# Patient Record
Sex: FEMALE | Race: BLACK OR AFRICAN AMERICAN
[De-identification: names, ages, dates, MRNs, and addresses within clinical notes are randomized per-mention and may not be internally consistent; named-entity substitution may affect disease eponyms.]

---

## 2021-11-11 ENCOUNTER — HOSPITAL ENCOUNTER (INPATIENT)
Dept: HOSPITAL 54 - ER | Age: 56
LOS: 3 days | Discharge: NURSING, REHAB OR LONG-TERM CARE | DRG: 871 | End: 2021-11-14
Attending: NURSE PRACTITIONER | Admitting: NURSE PRACTITIONER
Payer: MEDICARE

## 2021-11-11 VITALS — DIASTOLIC BLOOD PRESSURE: 79 MMHG | SYSTOLIC BLOOD PRESSURE: 119 MMHG

## 2021-11-11 VITALS — WEIGHT: 126 LBS | HEIGHT: 65 IN | BODY MASS INDEX: 20.99 KG/M2

## 2021-11-11 VITALS — DIASTOLIC BLOOD PRESSURE: 66 MMHG | SYSTOLIC BLOOD PRESSURE: 112 MMHG

## 2021-11-11 VITALS — DIASTOLIC BLOOD PRESSURE: 67 MMHG | SYSTOLIC BLOOD PRESSURE: 110 MMHG

## 2021-11-11 VITALS — DIASTOLIC BLOOD PRESSURE: 66 MMHG | SYSTOLIC BLOOD PRESSURE: 109 MMHG

## 2021-11-11 VITALS — SYSTOLIC BLOOD PRESSURE: 120 MMHG | DIASTOLIC BLOOD PRESSURE: 67 MMHG

## 2021-11-11 VITALS — DIASTOLIC BLOOD PRESSURE: 68 MMHG | SYSTOLIC BLOOD PRESSURE: 118 MMHG

## 2021-11-11 VITALS — DIASTOLIC BLOOD PRESSURE: 65 MMHG | SYSTOLIC BLOOD PRESSURE: 106 MMHG

## 2021-11-11 VITALS — DIASTOLIC BLOOD PRESSURE: 62 MMHG | SYSTOLIC BLOOD PRESSURE: 103 MMHG

## 2021-11-11 VITALS — SYSTOLIC BLOOD PRESSURE: 112 MMHG | DIASTOLIC BLOOD PRESSURE: 63 MMHG

## 2021-11-11 VITALS — DIASTOLIC BLOOD PRESSURE: 68 MMHG | SYSTOLIC BLOOD PRESSURE: 114 MMHG

## 2021-11-11 VITALS — SYSTOLIC BLOOD PRESSURE: 108 MMHG | DIASTOLIC BLOOD PRESSURE: 66 MMHG

## 2021-11-11 VITALS — SYSTOLIC BLOOD PRESSURE: 116 MMHG | DIASTOLIC BLOOD PRESSURE: 71 MMHG

## 2021-11-11 VITALS — SYSTOLIC BLOOD PRESSURE: 110 MMHG | DIASTOLIC BLOOD PRESSURE: 66 MMHG

## 2021-11-11 VITALS — DIASTOLIC BLOOD PRESSURE: 68 MMHG | SYSTOLIC BLOOD PRESSURE: 113 MMHG

## 2021-11-11 VITALS — SYSTOLIC BLOOD PRESSURE: 106 MMHG | DIASTOLIC BLOOD PRESSURE: 68 MMHG

## 2021-11-11 DIAGNOSIS — Z79.899: ICD-10-CM

## 2021-11-11 DIAGNOSIS — N31.9: ICD-10-CM

## 2021-11-11 DIAGNOSIS — Z88.2: ICD-10-CM

## 2021-11-11 DIAGNOSIS — Z89.611: ICD-10-CM

## 2021-11-11 DIAGNOSIS — Z79.82: ICD-10-CM

## 2021-11-11 DIAGNOSIS — E87.2: ICD-10-CM

## 2021-11-11 DIAGNOSIS — E11.9: ICD-10-CM

## 2021-11-11 DIAGNOSIS — J96.02: ICD-10-CM

## 2021-11-11 DIAGNOSIS — N39.0: ICD-10-CM

## 2021-11-11 DIAGNOSIS — I10: ICD-10-CM

## 2021-11-11 DIAGNOSIS — G89.29: ICD-10-CM

## 2021-11-11 DIAGNOSIS — R53.2: ICD-10-CM

## 2021-11-11 DIAGNOSIS — Z86.73: ICD-10-CM

## 2021-11-11 DIAGNOSIS — G93.41: ICD-10-CM

## 2021-11-11 DIAGNOSIS — E78.5: ICD-10-CM

## 2021-11-11 DIAGNOSIS — Z20.822: ICD-10-CM

## 2021-11-11 DIAGNOSIS — G40.901: ICD-10-CM

## 2021-11-11 DIAGNOSIS — I25.10: ICD-10-CM

## 2021-11-11 DIAGNOSIS — G35: ICD-10-CM

## 2021-11-11 DIAGNOSIS — A41.1: Primary | ICD-10-CM

## 2021-11-11 LAB
ALBUMIN SERPL BCP-MCNC: 3.5 G/DL (ref 3.4–5)
ALP SERPL-CCNC: 128 U/L (ref 46–116)
ALT SERPL W P-5'-P-CCNC: 18 U/L (ref 12–78)
AST SERPL W P-5'-P-CCNC: 31 U/L (ref 15–37)
BASE EXCESS BLDA CALC-SCNC: -0.6 MMOL/L
BASOPHILS # BLD AUTO: 0.1 K/UL (ref 0–0.2)
BASOPHILS NFR BLD AUTO: 0.5 % (ref 0–2)
BILIRUB DIRECT SERPL-MCNC: 0 MG/DL (ref 0–0.2)
BILIRUB SERPL-MCNC: 0.4 MG/DL (ref 0.2–1)
BILIRUB UR QL STRIP: NEGATIVE
BUN SERPL-MCNC: 9 MG/DL (ref 7–18)
CALCIUM SERPL-MCNC: 10.3 MG/DL (ref 8.5–10.1)
CHLORIDE SERPL-SCNC: 101 MMOL/L (ref 98–107)
CO2 SERPL-SCNC: 21 MMOL/L (ref 21–32)
COLOR UR: YELLOW
CREAT SERPL-MCNC: 0.8 MG/DL (ref 0.6–1.3)
EOSINOPHIL NFR BLD AUTO: 7.7 % (ref 0–6)
GLUCOSE SERPL-MCNC: 115 MG/DL (ref 74–106)
GLUCOSE UR STRIP-MCNC: NEGATIVE MG/DL
HCT VFR BLD AUTO: 41 % (ref 33–45)
HGB BLD-MCNC: 13.2 G/DL (ref 11.5–14.8)
INHALED O2 CONCENTRATION: 100 %
LEUKOCYTE ESTERASE UR QL STRIP: (no result)
LYMPHOCYTES NFR BLD AUTO: 18.3 % (ref 20–44)
LYMPHOCYTES NFR BLD AUTO: 2.7 K/UL (ref 0.8–4.8)
MCHC RBC AUTO-ENTMCNC: 32 G/DL (ref 31–36)
MCV RBC AUTO: 93 FL (ref 82–100)
MONOCYTES NFR BLD AUTO: 0.8 K/UL (ref 0.1–1.3)
MONOCYTES NFR BLD AUTO: 5.1 % (ref 2–12)
NEUTROPHILS # BLD AUTO: 10.3 K/UL (ref 1.8–8.9)
NEUTROPHILS NFR BLD AUTO: 68.4 % (ref 43–81)
NITRITE UR QL STRIP: POSITIVE
PCO2 TEMP ADJ BLDA: 51.6 MMHG (ref 35–45)
PH TEMP ADJ BLDA: 7.32 [PH] (ref 7.35–7.45)
PH UR STRIP: 8 [PH] (ref 5–8)
PLATELET # BLD AUTO: 346 K/UL (ref 150–450)
PO2 TEMP ADJ BLDA: 430.5 MMHG (ref 75–100)
POTASSIUM SERPL-SCNC: 4.4 MMOL/L (ref 3.5–5.1)
PROT SERPL-MCNC: 8.3 G/DL (ref 6.4–8.2)
PROT UR QL STRIP: 100 MG/DL
RBC # BLD AUTO: 4.42 MIL/UL (ref 4–5.2)
RBC #/AREA URNS HPF: (no result) /HPF (ref 0–2)
SODIUM SERPL-SCNC: 140 MMOL/L (ref 136–145)
UROBILINOGEN UR STRIP-MCNC: 0.2 EU/DL
VENTILATION MODE VENT: (no result)
WBC #/AREA URNS HPF: (no result) /HPF
WBC #/AREA URNS HPF: (no result) /HPF (ref 0–3)
WBC NRBC COR # BLD AUTO: 15 K/UL (ref 4.3–11)

## 2021-11-11 PROCEDURE — 05HY33Z INSERTION OF INFUSION DEVICE INTO UPPER VEIN, PERCUTANEOUS APPROACH: ICD-10-PCS | Performed by: NURSE PRACTITIONER

## 2021-11-11 PROCEDURE — U0003 INFECTIOUS AGENT DETECTION BY NUCLEIC ACID (DNA OR RNA); SEVERE ACUTE RESPIRATORY SYNDROME CORONAVIRUS 2 (SARS-COV-2) (CORONAVIRUS DISEASE [COVID-19]), AMPLIFIED PROBE TECHNIQUE, MAKING USE OF HIGH THROUGHPUT TECHNOLOGIES AS DESCRIBED BY CMS-2020-01-R: HCPCS

## 2021-11-11 PROCEDURE — G0378 HOSPITAL OBSERVATION PER HR: HCPCS

## 2021-11-11 RX ADMIN — LACOSAMIDE SCH MLS/HR: 10 INJECTION INTRAVENOUS at 17:39

## 2021-11-11 RX ADMIN — SODIUM CHLORIDE, SODIUM LACTATE, POTASSIUM CHLORIDE, AND CALCIUM CHLORIDE PRN MLS/HR: .6; .31; .03; .02 INJECTION, SOLUTION INTRAVENOUS at 17:39

## 2021-11-11 RX ADMIN — ATORVASTATIN CALCIUM SCH MG: 10 TABLET, FILM COATED ORAL at 21:19

## 2021-11-11 RX ADMIN — ENOXAPARIN SODIUM SCH MG: 40 INJECTION SUBCUTANEOUS at 18:11

## 2021-11-11 RX ADMIN — BACLOFEN SCH MG: 10 TABLET ORAL at 21:00

## 2021-11-11 RX ADMIN — PIPERACILLIN SODIUM AND TAZOBACTAM SODIUM SCH MLS/HR: .375; 3 INJECTION, POWDER, LYOPHILIZED, FOR SOLUTION INTRAVENOUS at 21:22

## 2021-11-11 RX ADMIN — FLUDROCORTISONE ACETATE SCH MG: 0.1 TABLET ORAL at 18:00

## 2021-11-11 RX ADMIN — DONEPEZIL HYDROCHLORIDE SCH MG: 5 TABLET ORAL at 21:19

## 2021-11-11 RX ADMIN — GABAPENTIN SCH MG: 100 CAPSULE ORAL at 17:00

## 2021-11-11 RX ADMIN — SODIUM CHLORIDE SCH MLS/HR: 9 INJECTION, SOLUTION INTRAVENOUS at 21:22

## 2021-11-11 NOTE — NUR
RN NOTES



PT AWAKE BUT DOESNT FOLLOW COMMAND. UNABLE TO TAKE ANYTHING PO AS OF NOW. MD MADE AWARE. OK 
TO HOLD NEURONTIN AND FLORINEF.

## 2021-11-11 NOTE — NUR
RN NOTES



NO SIGNIFICANT CHANGES THROUGHOUT THE SHIFT. NO FEVER. NO SOB. NO SEIZURE. ALL DUE MEDS 
GIVEN. NEEDS ATTENDED. KEPT CLEAN AND COMFORTABLE. SAFETY MEASURES IN PLACE. SEIZURE 
PRECAUTION. ENDORSED TO NIGHT RN FOR MIGUEL.

## 2021-11-11 NOTE — NUR
ICU RN NOTES



RECEIVED PT FROM ER. NONVERBAL. ON 2L O2 VIA NC, SATURATING @100%. CONNECTED TO MONITOR, 
READING ST. PRESCOTT CATH IN PLACE. DRAINING CLEAR YELLOW URINE. SKIN IS INTACT. IV ACCESS ON 
FADY MIDLINE, L FA #18 AND R HAND #20 ALL INTACT, PATENT AND FLUSHED. SAFETY MEAURES IN 
PLACE. SEIZURE PRECAUTION. BED LOCKED AND IN LOWEST POSITION WITH SIDE RAILS UP X3. WILL 
CONTINUE TO MONITOR.

## 2021-11-12 VITALS — DIASTOLIC BLOOD PRESSURE: 68 MMHG | SYSTOLIC BLOOD PRESSURE: 110 MMHG

## 2021-11-12 VITALS — SYSTOLIC BLOOD PRESSURE: 105 MMHG | DIASTOLIC BLOOD PRESSURE: 62 MMHG

## 2021-11-12 VITALS — DIASTOLIC BLOOD PRESSURE: 59 MMHG | SYSTOLIC BLOOD PRESSURE: 96 MMHG

## 2021-11-12 VITALS — DIASTOLIC BLOOD PRESSURE: 66 MMHG | SYSTOLIC BLOOD PRESSURE: 103 MMHG

## 2021-11-12 VITALS — DIASTOLIC BLOOD PRESSURE: 78 MMHG | SYSTOLIC BLOOD PRESSURE: 124 MMHG

## 2021-11-12 VITALS — SYSTOLIC BLOOD PRESSURE: 110 MMHG | DIASTOLIC BLOOD PRESSURE: 68 MMHG

## 2021-11-12 VITALS — DIASTOLIC BLOOD PRESSURE: 63 MMHG | SYSTOLIC BLOOD PRESSURE: 101 MMHG

## 2021-11-12 VITALS — SYSTOLIC BLOOD PRESSURE: 117 MMHG | DIASTOLIC BLOOD PRESSURE: 70 MMHG

## 2021-11-12 VITALS — DIASTOLIC BLOOD PRESSURE: 59 MMHG | SYSTOLIC BLOOD PRESSURE: 100 MMHG

## 2021-11-12 VITALS — SYSTOLIC BLOOD PRESSURE: 99 MMHG | DIASTOLIC BLOOD PRESSURE: 56 MMHG

## 2021-11-12 VITALS — DIASTOLIC BLOOD PRESSURE: 69 MMHG | SYSTOLIC BLOOD PRESSURE: 111 MMHG

## 2021-11-12 VITALS — DIASTOLIC BLOOD PRESSURE: 67 MMHG | SYSTOLIC BLOOD PRESSURE: 111 MMHG

## 2021-11-12 VITALS — DIASTOLIC BLOOD PRESSURE: 62 MMHG | SYSTOLIC BLOOD PRESSURE: 95 MMHG

## 2021-11-12 VITALS — DIASTOLIC BLOOD PRESSURE: 63 MMHG | SYSTOLIC BLOOD PRESSURE: 119 MMHG

## 2021-11-12 LAB
ALBUMIN SERPL BCP-MCNC: 3 G/DL (ref 3.4–5)
ALP SERPL-CCNC: 100 U/L (ref 46–116)
ALT SERPL W P-5'-P-CCNC: 16 U/L (ref 12–78)
AST SERPL W P-5'-P-CCNC: 26 U/L (ref 15–37)
BASOPHILS # BLD AUTO: 0 K/UL (ref 0–0.2)
BASOPHILS NFR BLD AUTO: 0.4 % (ref 0–2)
BILIRUB SERPL-MCNC: 0.5 MG/DL (ref 0.2–1)
BUN SERPL-MCNC: 7 MG/DL (ref 7–18)
CALCIUM SERPL-MCNC: 8.6 MG/DL (ref 8.5–10.1)
CHLORIDE SERPL-SCNC: 106 MMOL/L (ref 98–107)
CHOLEST SERPL-MCNC: 145 MG/DL (ref ?–200)
CO2 SERPL-SCNC: 26 MMOL/L (ref 21–32)
CREAT SERPL-MCNC: 0.5 MG/DL (ref 0.6–1.3)
EOSINOPHIL NFR BLD AUTO: 2 % (ref 0–6)
GLUCOSE SERPL-MCNC: 88 MG/DL (ref 74–106)
HCT VFR BLD AUTO: 36 % (ref 33–45)
HDLC SERPL-MCNC: 66 MG/DL (ref 40–60)
HGB BLD-MCNC: 11.8 G/DL (ref 11.5–14.8)
LDLC SERPL DIRECT ASSAY-MCNC: 61 MG/DL (ref 0–99)
LYMPHOCYTES NFR BLD AUTO: 17.9 % (ref 20–44)
LYMPHOCYTES NFR BLD AUTO: 2.1 K/UL (ref 0.8–4.8)
MAGNESIUM SERPL-MCNC: 1.8 MG/DL (ref 1.8–2.4)
MCHC RBC AUTO-ENTMCNC: 33 G/DL (ref 31–36)
MCV RBC AUTO: 92 FL (ref 82–100)
MONOCYTES NFR BLD AUTO: 0.7 K/UL (ref 0.1–1.3)
MONOCYTES NFR BLD AUTO: 5.6 % (ref 2–12)
NEUTROPHILS # BLD AUTO: 8.7 K/UL (ref 1.8–8.9)
NEUTROPHILS NFR BLD AUTO: 74.1 % (ref 43–81)
PHOSPHATE SERPL-MCNC: 2.8 MG/DL (ref 2.5–4.9)
PLATELET # BLD AUTO: 266 K/UL (ref 150–450)
POTASSIUM SERPL-SCNC: 3.2 MMOL/L (ref 3.5–5.1)
PROT SERPL-MCNC: 7.1 G/DL (ref 6.4–8.2)
RBC # BLD AUTO: 3.89 MIL/UL (ref 4–5.2)
SODIUM SERPL-SCNC: 143 MMOL/L (ref 136–145)
TRIGL SERPL-MCNC: 46 MG/DL (ref 30–150)
WBC NRBC COR # BLD AUTO: 11.8 K/UL (ref 4.3–11)

## 2021-11-12 RX ADMIN — FLUDROCORTISONE ACETATE SCH MG: 0.1 TABLET ORAL at 06:19

## 2021-11-12 RX ADMIN — SODIUM CHLORIDE SCH MLS/HR: 9 INJECTION, SOLUTION INTRAVENOUS at 09:05

## 2021-11-12 RX ADMIN — LACOSAMIDE SCH MLS/HR: 10 INJECTION INTRAVENOUS at 21:36

## 2021-11-12 RX ADMIN — ENOXAPARIN SODIUM SCH MG: 40 INJECTION SUBCUTANEOUS at 21:38

## 2021-11-12 RX ADMIN — FLUDROCORTISONE ACETATE SCH MG: 0.1 TABLET ORAL at 18:39

## 2021-11-12 RX ADMIN — VENLAFAXINE HYDROCHLORIDE SCH MG: 37.5 CAPSULE, EXTENDED RELEASE ORAL at 08:04

## 2021-11-12 RX ADMIN — PIPERACILLIN SODIUM AND TAZOBACTAM SODIUM SCH MLS/HR: .375; 3 INJECTION, POWDER, LYOPHILIZED, FOR SOLUTION INTRAVENOUS at 15:37

## 2021-11-12 RX ADMIN — ATORVASTATIN CALCIUM SCH MG: 10 TABLET, FILM COATED ORAL at 21:37

## 2021-11-12 RX ADMIN — AMLODIPINE BESYLATE SCH MG: 10 TABLET ORAL at 08:05

## 2021-11-12 RX ADMIN — GABAPENTIN SCH MG: 100 CAPSULE ORAL at 16:33

## 2021-11-12 RX ADMIN — BACLOFEN SCH MG: 10 TABLET ORAL at 21:38

## 2021-11-12 RX ADMIN — ASPIRIN 81 MG SCH MG: 81 TABLET ORAL at 08:04

## 2021-11-12 RX ADMIN — PIPERACILLIN SODIUM AND TAZOBACTAM SODIUM SCH MLS/HR: .375; 3 INJECTION, POWDER, LYOPHILIZED, FOR SOLUTION INTRAVENOUS at 03:44

## 2021-11-12 RX ADMIN — SODIUM CHLORIDE, SODIUM LACTATE, POTASSIUM CHLORIDE, AND CALCIUM CHLORIDE PRN MLS/HR: .6; .31; .03; .02 INJECTION, SOLUTION INTRAVENOUS at 20:28

## 2021-11-12 RX ADMIN — BACLOFEN SCH MG: 10 TABLET ORAL at 08:05

## 2021-11-12 RX ADMIN — POTASSIUM CHLORIDE SCH MEQ: 1500 TABLET, EXTENDED RELEASE ORAL at 10:30

## 2021-11-12 RX ADMIN — POTASSIUM CHLORIDE SCH MEQ: 1500 TABLET, EXTENDED RELEASE ORAL at 09:36

## 2021-11-12 RX ADMIN — BACLOFEN SCH MG: 10 TABLET ORAL at 12:18

## 2021-11-12 RX ADMIN — SODIUM CHLORIDE, SODIUM LACTATE, POTASSIUM CHLORIDE, AND CALCIUM CHLORIDE PRN MLS/HR: .6; .31; .03; .02 INJECTION, SOLUTION INTRAVENOUS at 05:22

## 2021-11-12 RX ADMIN — GABAPENTIN SCH MG: 100 CAPSULE ORAL at 12:18

## 2021-11-12 RX ADMIN — DONEPEZIL HYDROCHLORIDE SCH MG: 5 TABLET ORAL at 21:36

## 2021-11-12 RX ADMIN — FAMOTIDINE SCH MG: 20 TABLET, FILM COATED ORAL at 08:05

## 2021-11-12 RX ADMIN — FLUDROCORTISONE ACETATE SCH MG: 0.1 TABLET ORAL at 00:00

## 2021-11-12 RX ADMIN — PIPERACILLIN SODIUM AND TAZOBACTAM SODIUM SCH MLS/HR: .375; 3 INJECTION, POWDER, LYOPHILIZED, FOR SOLUTION INTRAVENOUS at 20:36

## 2021-11-12 RX ADMIN — LACOSAMIDE SCH MLS/HR: 10 INJECTION INTRAVENOUS at 09:05

## 2021-11-12 RX ADMIN — GABAPENTIN SCH MG: 100 CAPSULE ORAL at 08:04

## 2021-11-12 RX ADMIN — SODIUM CHLORIDE SCH MLS/HR: 9 INJECTION, SOLUTION INTRAVENOUS at 21:36

## 2021-11-12 RX ADMIN — PIPERACILLIN SODIUM AND TAZOBACTAM SODIUM SCH MLS/HR: .375; 3 INJECTION, POWDER, LYOPHILIZED, FOR SOLUTION INTRAVENOUS at 08:04

## 2021-11-12 RX ADMIN — FLUDROCORTISONE ACETATE SCH MG: 0.1 TABLET ORAL at 11:53

## 2021-11-12 RX ADMIN — BACLOFEN SCH MG: 10 TABLET ORAL at 16:33

## 2021-11-12 NOTE — NUR
RN NOTES



RECEIVED REPORT FROM MORNING NURSE. PATIENT IN BED A/O X 2. WITH PERIODS OF CONFUSION. WITH 
IC ACCESS AT R HAND G # 20, L FA # 18, FADY PICC PATENT FLUSHES WELL NO INFILTRATION NOTES. 
WITH ONGOING IVF OF LR @ 100 CC/HR. WITH OXYGEN INHALATION AT 2 LPM VIA NC TOLERATED WELL. 
NO SOB NO DISTRESS NOTED. WITH PRESCOTT CATHETER CONNECTED TO URINE BAG DRAINING WELL WITH 
YELLOWISH URINE OUTPUT. ALL SAFETY MEASURES IN PLACE, SEIZURE PRECAUTION. HOB ELEVATED SIDE 
RAILS UP FOR SAFETY, CALL LIGHT WITHIN REACH. WILL CONTINUE TO MONITOR PATIENT.

## 2021-11-12 NOTE — NUR
RN NOTE

TRANSFERRED PATIENT TO ROOM 112-1 WITH O2 VIA NC @2LPM, TELE MONITOR SINUS TACHYCARDIA, NO 
COMPLAINS OF PAIN AT THIS TIME, BED SIDE REPORT GIVEN TO SHANDA FLORES, PLAN OF CARE ENDORSED.

## 2021-11-12 NOTE — NUR
RN NOTE



RECEIVE PATIENT TRANSFER FROM ICU FROM ROOM 258-1. PATIENT IN STABLE CONDITION AT TIME OF 
TRANSPORT. TELEMETRY MONITOR IN PLACE. PATIENT IS RECEIVING OXYGEN VIA NC @ 2l/min. WILL 
CONTINUE TO MONITOR.

## 2021-11-12 NOTE — NUR
RN CLOSING NOTE



PATIENT WAS TRANSFER FROM ICU. DIAGNOSIS OF STATUS EPILEPTICUS. RECEIVING OXYGEN VIA NASAL 
CANULA @ 2L/MIN. MAINTAINING O2 SAT %. GOAL TO MONITOR LAB. MONITOR TROP TREND. 
POTASSIUM TAB WAS GIVEN. PATIENT ABLE TO COMMUNICATE. PROPER PRECAUTION IN PLACE. BED ON 
LOWEST POSITION WITH HOB ELEVATED AND 3 SIDE RAIL UP. CALL LIGHT WITHIN REACH. WILL CONTINUE 
TO MONITOR AND REPORT TO ON COMING NURSE.

## 2021-11-13 VITALS — DIASTOLIC BLOOD PRESSURE: 72 MMHG | SYSTOLIC BLOOD PRESSURE: 120 MMHG

## 2021-11-13 VITALS — DIASTOLIC BLOOD PRESSURE: 61 MMHG | SYSTOLIC BLOOD PRESSURE: 102 MMHG

## 2021-11-13 VITALS — SYSTOLIC BLOOD PRESSURE: 126 MMHG | DIASTOLIC BLOOD PRESSURE: 62 MMHG

## 2021-11-13 VITALS — SYSTOLIC BLOOD PRESSURE: 117 MMHG | DIASTOLIC BLOOD PRESSURE: 70 MMHG

## 2021-11-13 VITALS — DIASTOLIC BLOOD PRESSURE: 65 MMHG | SYSTOLIC BLOOD PRESSURE: 129 MMHG

## 2021-11-13 VITALS — SYSTOLIC BLOOD PRESSURE: 130 MMHG | DIASTOLIC BLOOD PRESSURE: 68 MMHG

## 2021-11-13 LAB
BUN SERPL-MCNC: 2 MG/DL (ref 7–18)
CALCIUM SERPL-MCNC: 8.5 MG/DL (ref 8.5–10.1)
CHLORIDE SERPL-SCNC: 103 MMOL/L (ref 98–107)
CO2 SERPL-SCNC: 29 MMOL/L (ref 21–32)
CREAT SERPL-MCNC: 0.5 MG/DL (ref 0.6–1.3)
GLUCOSE SERPL-MCNC: 93 MG/DL (ref 74–106)
POTASSIUM SERPL-SCNC: 2.9 MMOL/L (ref 3.5–5.1)
SODIUM SERPL-SCNC: 142 MMOL/L (ref 136–145)

## 2021-11-13 RX ADMIN — POTASSIUM CHLORIDE SCH MEQ: 1500 TABLET, EXTENDED RELEASE ORAL at 10:30

## 2021-11-13 RX ADMIN — BACLOFEN SCH MG: 10 TABLET ORAL at 14:12

## 2021-11-13 RX ADMIN — FLUDROCORTISONE ACETATE SCH MG: 0.1 TABLET ORAL at 17:25

## 2021-11-13 RX ADMIN — FLUDROCORTISONE ACETATE SCH MG: 0.1 TABLET ORAL at 05:45

## 2021-11-13 RX ADMIN — FAMOTIDINE SCH MG: 20 TABLET, FILM COATED ORAL at 09:38

## 2021-11-13 RX ADMIN — FLUDROCORTISONE ACETATE SCH MG: 0.1 TABLET ORAL at 14:12

## 2021-11-13 RX ADMIN — PIPERACILLIN SODIUM AND TAZOBACTAM SODIUM SCH MLS/HR: .375; 3 INJECTION, POWDER, LYOPHILIZED, FOR SOLUTION INTRAVENOUS at 09:31

## 2021-11-13 RX ADMIN — VENLAFAXINE HYDROCHLORIDE SCH MG: 37.5 CAPSULE, EXTENDED RELEASE ORAL at 09:32

## 2021-11-13 RX ADMIN — FLUDROCORTISONE ACETATE SCH MG: 0.1 TABLET ORAL at 00:16

## 2021-11-13 RX ADMIN — GABAPENTIN SCH MG: 100 CAPSULE ORAL at 09:29

## 2021-11-13 RX ADMIN — LACOSAMIDE SCH MLS/HR: 10 INJECTION INTRAVENOUS at 22:04

## 2021-11-13 RX ADMIN — ATORVASTATIN CALCIUM SCH MG: 10 TABLET, FILM COATED ORAL at 22:07

## 2021-11-13 RX ADMIN — ASPIRIN 81 MG SCH MG: 81 TABLET ORAL at 09:29

## 2021-11-13 RX ADMIN — AMLODIPINE BESYLATE SCH MG: 10 TABLET ORAL at 09:31

## 2021-11-13 RX ADMIN — PIPERACILLIN SODIUM AND TAZOBACTAM SODIUM SCH MLS/HR: .375; 3 INJECTION, POWDER, LYOPHILIZED, FOR SOLUTION INTRAVENOUS at 15:09

## 2021-11-13 RX ADMIN — DONEPEZIL HYDROCHLORIDE SCH MG: 5 TABLET ORAL at 22:07

## 2021-11-13 RX ADMIN — GABAPENTIN SCH MG: 100 CAPSULE ORAL at 14:12

## 2021-11-13 RX ADMIN — POTASSIUM CHLORIDE SCH MEQ: 1500 TABLET, EXTENDED RELEASE ORAL at 09:35

## 2021-11-13 RX ADMIN — BACLOFEN SCH MG: 10 TABLET ORAL at 22:07

## 2021-11-13 RX ADMIN — SODIUM CHLORIDE SCH MLS/HR: 9 INJECTION, SOLUTION INTRAVENOUS at 09:00

## 2021-11-13 RX ADMIN — MUPIROCIN SCH GM: 20 OINTMENT TOPICAL at 22:11

## 2021-11-13 RX ADMIN — POTASSIUM CHLORIDE SCH MEQ: 1500 TABLET, EXTENDED RELEASE ORAL at 09:29

## 2021-11-13 RX ADMIN — SODIUM CHLORIDE SCH MLS/HR: 9 INJECTION, SOLUTION INTRAVENOUS at 22:04

## 2021-11-13 RX ADMIN — GABAPENTIN SCH MG: 100 CAPSULE ORAL at 17:25

## 2021-11-13 RX ADMIN — MUPIROCIN SCH GM: 20 OINTMENT TOPICAL at 14:13

## 2021-11-13 RX ADMIN — PIPERACILLIN SODIUM AND TAZOBACTAM SODIUM SCH MLS/HR: .375; 3 INJECTION, POWDER, LYOPHILIZED, FOR SOLUTION INTRAVENOUS at 02:38

## 2021-11-13 RX ADMIN — BACLOFEN SCH MG: 10 TABLET ORAL at 09:29

## 2021-11-13 RX ADMIN — ENOXAPARIN SODIUM SCH MG: 40 INJECTION SUBCUTANEOUS at 22:06

## 2021-11-13 RX ADMIN — LACOSAMIDE SCH MLS/HR: 10 INJECTION INTRAVENOUS at 09:44

## 2021-11-13 RX ADMIN — BACLOFEN SCH MG: 10 TABLET ORAL at 17:25

## 2021-11-13 NOTE — NUR
RN NOTES



PATIENT REMAINS STABLE THE WHOLE SHIFT NO SIGNIFICANT CHANGES IN HEALTH CONDITION. NO SOB, 
NO DISTRESS NO PAIN NOTED THIS SHIFT.ALL DUE MEDS GIVEN AS ORDERED. NO EPISODE OF SEIZURE 
NOTED.  ALL SAFETY MEASURES IN PLACE, BED ON LOWEST POSITION AND LOCKED. HOB ELEVATED, CALL 
LIGHT WITHIN REACH.  ALL NEEDS ATTENDED. ENDORSED

## 2021-11-13 NOTE — NUR
RN NOTES





RECEIVED REPORT FROM MORNING NURSE. PATIENT IN BED A/O X2 WITH PERIODS OF CONFUSION. NO SOB, 
NO DISTRESS NOTED.WITH L UA PICC LINE, LFA G#20 PATENT. WITH ONGOING IVF OF LR @ 100CC/HR. 
WITH PRESCOTT CONNECTED TO URINE BAG DRAINING YELLOWISH URINE. ALL SAFETY MEASURES IN PLACE, 
HOB ELEVATED, BOTH SIDERAILS UP FOR SAFETY. CALL LIGHT WITHIN REACH. WILL CONTINUE TO 
MONITOR THE PATIENT

## 2021-11-14 VITALS — DIASTOLIC BLOOD PRESSURE: 65 MMHG | SYSTOLIC BLOOD PRESSURE: 121 MMHG

## 2021-11-14 VITALS — DIASTOLIC BLOOD PRESSURE: 56 MMHG | SYSTOLIC BLOOD PRESSURE: 92 MMHG

## 2021-11-14 PROCEDURE — B547ZZA ULTRASONOGRAPHY OF LEFT SUBCLAVIAN VEIN, GUIDANCE: ICD-10-PCS | Performed by: NURSE PRACTITIONER

## 2021-11-14 PROCEDURE — 05H633Z INSERTION OF INFUSION DEVICE INTO LEFT SUBCLAVIAN VEIN, PERCUTANEOUS APPROACH: ICD-10-PCS | Performed by: NURSE PRACTITIONER

## 2021-11-14 RX ADMIN — GABAPENTIN SCH MG: 100 CAPSULE ORAL at 16:45

## 2021-11-14 RX ADMIN — AMLODIPINE BESYLATE SCH MG: 10 TABLET ORAL at 08:53

## 2021-11-14 RX ADMIN — FLUDROCORTISONE ACETATE SCH MG: 0.1 TABLET ORAL at 05:34

## 2021-11-14 RX ADMIN — GABAPENTIN SCH MG: 100 CAPSULE ORAL at 08:51

## 2021-11-14 RX ADMIN — MUPIROCIN SCH GM: 20 OINTMENT TOPICAL at 09:08

## 2021-11-14 RX ADMIN — SODIUM CHLORIDE, SODIUM LACTATE, POTASSIUM CHLORIDE, AND CALCIUM CHLORIDE PRN MLS/HR: .6; .31; .03; .02 INJECTION, SOLUTION INTRAVENOUS at 10:18

## 2021-11-14 RX ADMIN — BACLOFEN SCH MG: 10 TABLET ORAL at 16:45

## 2021-11-14 RX ADMIN — ASPIRIN 81 MG SCH MG: 81 TABLET ORAL at 08:51

## 2021-11-14 RX ADMIN — FLUDROCORTISONE ACETATE SCH MG: 0.1 TABLET ORAL at 12:08

## 2021-11-14 RX ADMIN — FLUDROCORTISONE ACETATE SCH MG: 0.1 TABLET ORAL at 00:36

## 2021-11-14 RX ADMIN — LACOSAMIDE SCH MLS/HR: 10 INJECTION INTRAVENOUS at 09:08

## 2021-11-14 RX ADMIN — BACLOFEN SCH MG: 10 TABLET ORAL at 08:56

## 2021-11-14 RX ADMIN — SODIUM CHLORIDE SCH MLS/HR: 9 INJECTION, SOLUTION INTRAVENOUS at 09:09

## 2021-11-14 RX ADMIN — BACLOFEN SCH MG: 10 TABLET ORAL at 12:08

## 2021-11-14 RX ADMIN — VENLAFAXINE HYDROCHLORIDE SCH MG: 37.5 CAPSULE, EXTENDED RELEASE ORAL at 08:54

## 2021-11-14 RX ADMIN — FAMOTIDINE SCH MG: 20 TABLET, FILM COATED ORAL at 08:36

## 2021-11-14 RX ADMIN — GABAPENTIN SCH MG: 100 CAPSULE ORAL at 12:08

## 2021-11-14 NOTE — NUR
RN NOTES





PATIENT REMAINS STABLE THE WHOLE SHIFT, NO SIGNIFICANT CHANGES IN HEALTH CONDITION.PATIENT 
A/O X2 WITH PERIODS OF CONFUSION. NO SOB, NO DISTRESS NO PAIN NOTED. NO SEIZURE EPISODE 
NOTED THIS SHIFT.ALL DUE MEDS GIVEN AS ORDERED. PATIENT ON IV ATB NO ASE NOTED. PRESCOTT PATENT 
DRAINING WELL. ALL SAFETY MEASURES IN PLACE AT ALL TIMES HOB ELEVATED, BOTH SIDE RAILS UP 
FOR SAFETY. CALL LIGHT WITHIN REACH.KEPT CLEAN AND DRY AT ALL TIMES. ENDORSED

## 2021-11-14 NOTE — NUR
RN DISCHARGE NOTE



PT WAS DISCHARGED WITH STABLE VITALS. DISCHARGE SUMMARY AND INSTRUCTIONS REVIEWED WITH 
PATIENT AND SIGNED. PT VERBALIZED UNDERSTANDING. ID BAND REMOVED. IV ACCESS AND PRESCOTT 
CATHETER KEPT INTACT. BELONGINGS FORM SIGNED. PATIENT WAS PICKED UP BY AMBULANCE AT THIS 
TIME. [facility]

## 2021-11-14 NOTE — NUR
RN OPENING NOTE



RECEIVED PATIENT ASLEEP IN BED, EASY TO AROUSE. A/O X 2. NO S/SX OF DISTRESS NOTED. PATIENT 
ON 2L O2 VIA NC SATURATING 99%. BREATHING IS EVEN AND UNLABORED; NO SOB NOTED. NO SIGNS OR 
COMPLAINTS OF PAIN AT THIS TIME. IV ACCESS FADY PICC AND LFA#18 PATENT AND INTACT WITH LR 
RUNNING @100MLS/HR. SAFETY PRECAUTIONS IN PLACE; BED IN LOW POSITION AND LOCKED, SIDE RAILS 
UP X2, CALL LIGHT WITHIN REACH. WILL CONTINUE TO MONITOR PATIENT.

## 2021-11-18 ENCOUNTER — HOSPITAL ENCOUNTER (INPATIENT)
Dept: HOSPITAL 54 - ER | Age: 56
LOS: 4 days | Discharge: NURSING, REHAB OR LONG-TERM CARE | DRG: 871 | End: 2021-11-22
Attending: FAMILY MEDICINE | Admitting: NURSE PRACTITIONER
Payer: MEDICARE

## 2021-11-18 VITALS — WEIGHT: 117 LBS | BODY MASS INDEX: 19.49 KG/M2 | HEIGHT: 65 IN

## 2021-11-18 DIAGNOSIS — E87.6: ICD-10-CM

## 2021-11-18 DIAGNOSIS — R09.02: ICD-10-CM

## 2021-11-18 DIAGNOSIS — I10: ICD-10-CM

## 2021-11-18 DIAGNOSIS — J15.9: ICD-10-CM

## 2021-11-18 DIAGNOSIS — Z89.611: ICD-10-CM

## 2021-11-18 DIAGNOSIS — E11.42: ICD-10-CM

## 2021-11-18 DIAGNOSIS — A41.9: Primary | ICD-10-CM

## 2021-11-18 DIAGNOSIS — E87.0: ICD-10-CM

## 2021-11-18 DIAGNOSIS — F32.A: ICD-10-CM

## 2021-11-18 DIAGNOSIS — F03.90: ICD-10-CM

## 2021-11-18 DIAGNOSIS — E78.5: ICD-10-CM

## 2021-11-18 DIAGNOSIS — G35: ICD-10-CM

## 2021-11-18 DIAGNOSIS — E43: ICD-10-CM

## 2021-11-18 DIAGNOSIS — G82.50: ICD-10-CM

## 2021-11-18 DIAGNOSIS — Z88.2: ICD-10-CM

## 2021-11-18 DIAGNOSIS — E88.09: ICD-10-CM

## 2021-11-18 DIAGNOSIS — Z20.822: ICD-10-CM

## 2021-11-18 DIAGNOSIS — I25.10: ICD-10-CM

## 2021-11-18 DIAGNOSIS — G93.41: ICD-10-CM

## 2021-11-18 DIAGNOSIS — Z79.82: ICD-10-CM

## 2021-11-18 DIAGNOSIS — Z86.73: ICD-10-CM

## 2021-11-18 DIAGNOSIS — N39.0: ICD-10-CM

## 2021-11-18 DIAGNOSIS — G40.909: ICD-10-CM

## 2021-11-18 LAB
ALBUMIN SERPL BCP-MCNC: 2.5 G/DL (ref 3.4–5)
ALP SERPL-CCNC: 94 U/L (ref 46–116)
ALT SERPL W P-5'-P-CCNC: 12 U/L (ref 12–78)
AST SERPL W P-5'-P-CCNC: 16 U/L (ref 15–37)
BASOPHILS # BLD AUTO: 0.1 K/UL (ref 0–0.2)
BASOPHILS NFR BLD AUTO: 0.7 % (ref 0–2)
BILIRUB DIRECT SERPL-MCNC: 0.1 MG/DL (ref 0–0.2)
BILIRUB SERPL-MCNC: 0.4 MG/DL (ref 0.2–1)
BUN SERPL-MCNC: 12 MG/DL (ref 7–18)
CALCIUM SERPL-MCNC: 8.8 MG/DL (ref 8.5–10.1)
CHLORIDE SERPL-SCNC: 102 MMOL/L (ref 98–107)
CO2 SERPL-SCNC: 31 MMOL/L (ref 21–32)
COLOR UR: YELLOW
CREAT SERPL-MCNC: 0.9 MG/DL (ref 0.6–1.3)
DEPRECATED SQUAMOUS URNS QL MICRO: (no result) /HPF
EOSINOPHIL NFR BLD AUTO: 0.8 % (ref 0–6)
GLUCOSE SERPL-MCNC: 107 MG/DL (ref 74–106)
HCT VFR BLD AUTO: 35 % (ref 33–45)
HGB BLD-MCNC: 11.4 G/DL (ref 11.5–14.8)
LYMPHOCYTES NFR BLD AUTO: 1.2 K/UL (ref 0.8–4.8)
LYMPHOCYTES NFR BLD AUTO: 6.3 % (ref 20–44)
LYMPHOCYTES NFR BLD MANUAL: 4 % (ref 16–48)
MCHC RBC AUTO-ENTMCNC: 32 G/DL (ref 31–36)
MCV RBC AUTO: 91 FL (ref 82–100)
MONOCYTES NFR BLD AUTO: 1.1 K/UL (ref 0.1–1.3)
MONOCYTES NFR BLD AUTO: 5.7 % (ref 2–12)
MONOCYTES NFR BLD MANUAL: 5 % (ref 0–11)
NEUTROPHILS # BLD AUTO: 16.6 K/UL (ref 1.8–8.9)
NEUTROPHILS NFR BLD AUTO: 86.5 % (ref 43–81)
NEUTS BAND NFR BLD MANUAL: 7 % (ref 0–5)
NEUTS SEG NFR BLD MANUAL: 84 % (ref 42–76)
PH UR STRIP: 7 [PH] (ref 5–8)
PLATELET # BLD AUTO: 297 K/UL (ref 150–450)
POTASSIUM SERPL-SCNC: 2.6 MMOL/L (ref 3.5–5.1)
PROT SERPL-MCNC: 7.2 G/DL (ref 6.4–8.2)
RBC # BLD AUTO: 3.88 MIL/UL (ref 4–5.2)
RBC #/AREA URNS HPF: (no result) /HPF (ref 0–2)
SODIUM SERPL-SCNC: 141 MMOL/L (ref 136–145)
UROBILINOGEN UR STRIP-MCNC: 0.2 EU/DL
WBC NRBC COR # BLD AUTO: 19.1 K/UL (ref 4.3–11)

## 2021-11-18 PROCEDURE — U0003 INFECTIOUS AGENT DETECTION BY NUCLEIC ACID (DNA OR RNA); SEVERE ACUTE RESPIRATORY SYNDROME CORONAVIRUS 2 (SARS-COV-2) (CORONAVIRUS DISEASE [COVID-19]), AMPLIFIED PROBE TECHNIQUE, MAKING USE OF HIGH THROUGHPUT TECHNOLOGIES AS DESCRIBED BY CMS-2020-01-R: HCPCS

## 2021-11-18 PROCEDURE — G0378 HOSPITAL OBSERVATION PER HR: HCPCS

## 2021-11-18 RX ADMIN — POTASSIUM CHLORIDE SCH MLS/HR: 200 INJECTION, SOLUTION INTRAVENOUS at 23:51

## 2021-11-18 RX ADMIN — POTASSIUM CHLORIDE SCH MLS/HR: 200 INJECTION, SOLUTION INTRAVENOUS at 22:30

## 2021-11-18 RX ADMIN — ENOXAPARIN SODIUM SCH MG: 40 INJECTION SUBCUTANEOUS at 23:06

## 2021-11-18 RX ADMIN — SODIUM CHLORIDE PRN MLS/HR: 9 INJECTION, SOLUTION INTRAVENOUS at 23:14

## 2021-11-18 NOTE — NUR
PT TRANSPORTED  ON CARDIAC MONITOR PER ACLS PROTOCOL WITHOUT INCIDENT 
WITH POTASSIUM 50ML/HR INFUSING UPON TRANSFER.

## 2021-11-18 NOTE — NUR
PT CAME TO ER FOR GENERALIZED WEAKNESS X 4 DAYS. PT IS NOT SPEAKING, UNABLE TO 
DETERMINE IF THIS IS BASELINE, BUT UNDERSTANDS AND RESPONDS TO COMMANDS 
NONVERBALLY. ADMITS NAUSEA, VOMITING. DENIES FEVER, CHILLS. DOES NOT LIFT UPPER 
OR LOWER EXTREMITIES AGAINST GRAVITY. L LOWER EXTREMITY STRENGTH 2/5. DOES NOT 
MOVE R LOWER EXTREMITY. ON MONITOR. HR 98. ALL OTHER VS STABLE.

## 2021-11-18 NOTE — NUR
VERIFIED WITH MD REGARDING LACTIC ACID. STILL UNABLE TO DRAW D/T HARD STICK. 
TRIED WIDRAWING FROM MIDLINE WITHOUT SUCCESS. MD IS OK TO SEND PT UP TO UNIT 
WITHOUR LACTIC ACID DRAW.

## 2021-11-18 NOTE — NUR
RN ADMISSION NOTE

RECEIVED PT @2241 FROM E.R. VIA GURNEY TO [id number] ACCOMPANIED BY RN STAFF. PT AWAKE, A/OX3, 
ABLE TO VERBALIZE NEEDS. PT DENIES ANY PAIN AT THIS TIME. RESPIRATIONS EVEN/UNLABORED. ON O2 
@3LPM VIA NC. IV SITE: FADY MIDLINE INTACT/PATENT, ONGOING IV POTASSIUM FROM TRANSFER AND NS 
@75ML/HR. CONNECTED TO TELE MONITOR, READING SR WITH HR 74. PT WITH RUE FLEXION 
CONTRACTURES, QUADRIPLEGIC WITH SOME MOVEMENT ON LUE. TOTAL CARE PROVIDED. PT IN NO ACUTE 
DISTRESS. SAFETY MEASURES IN PLACE, BED IN LOWEST LOCKED POSITION, S/R UPX2, CALL LIGHT 
WITHIN REACH. WILL CONT TO MONITOR.

## 2021-11-19 VITALS — DIASTOLIC BLOOD PRESSURE: 69 MMHG | SYSTOLIC BLOOD PRESSURE: 111 MMHG

## 2021-11-19 VITALS — DIASTOLIC BLOOD PRESSURE: 79 MMHG | SYSTOLIC BLOOD PRESSURE: 129 MMHG

## 2021-11-19 VITALS — DIASTOLIC BLOOD PRESSURE: 59 MMHG | SYSTOLIC BLOOD PRESSURE: 98 MMHG

## 2021-11-19 VITALS — DIASTOLIC BLOOD PRESSURE: 71 MMHG | SYSTOLIC BLOOD PRESSURE: 118 MMHG

## 2021-11-19 LAB
ALBUMIN SERPL BCP-MCNC: 2.3 G/DL (ref 3.4–5)
ALP SERPL-CCNC: 89 U/L (ref 46–116)
ALT SERPL W P-5'-P-CCNC: 11 U/L (ref 12–78)
AST SERPL W P-5'-P-CCNC: 15 U/L (ref 15–37)
BASOPHILS # BLD AUTO: 0.1 K/UL (ref 0–0.2)
BASOPHILS NFR BLD AUTO: 0.5 % (ref 0–2)
BILIRUB SERPL-MCNC: 0.6 MG/DL (ref 0.2–1)
BUN SERPL-MCNC: 11 MG/DL (ref 7–18)
CALCIUM SERPL-MCNC: 8.3 MG/DL (ref 8.5–10.1)
CHLORIDE SERPL-SCNC: 102 MMOL/L (ref 98–107)
CO2 SERPL-SCNC: 33 MMOL/L (ref 21–32)
CREAT SERPL-MCNC: 1 MG/DL (ref 0.6–1.3)
EOSINOPHIL NFR BLD AUTO: 5.2 % (ref 0–6)
GLUCOSE SERPL-MCNC: 81 MG/DL (ref 74–106)
HCT VFR BLD AUTO: 32 % (ref 33–45)
HGB BLD-MCNC: 10.4 G/DL (ref 11.5–14.8)
LYMPHOCYTES NFR BLD AUTO: 1.6 K/UL (ref 0.8–4.8)
LYMPHOCYTES NFR BLD AUTO: 13.4 % (ref 20–44)
MAGNESIUM SERPL-MCNC: 1.9 MG/DL (ref 1.8–2.4)
MCHC RBC AUTO-ENTMCNC: 33 G/DL (ref 31–36)
MCV RBC AUTO: 92 FL (ref 82–100)
MONOCYTES NFR BLD AUTO: 0.7 K/UL (ref 0.1–1.3)
MONOCYTES NFR BLD AUTO: 6.3 % (ref 2–12)
NEUTROPHILS # BLD AUTO: 8.8 K/UL (ref 1.8–8.9)
NEUTROPHILS NFR BLD AUTO: 74.6 % (ref 43–81)
PHOSPHATE SERPL-MCNC: 1.9 MG/DL (ref 2.5–4.9)
PLATELET # BLD AUTO: 314 K/UL (ref 150–450)
POTASSIUM SERPL-SCNC: 2.7 MMOL/L (ref 3.5–5.1)
PROT SERPL-MCNC: 6.4 G/DL (ref 6.4–8.2)
RBC # BLD AUTO: 3.47 MIL/UL (ref 4–5.2)
SODIUM SERPL-SCNC: 143 MMOL/L (ref 136–145)
WBC NRBC COR # BLD AUTO: 11.8 K/UL (ref 4.3–11)

## 2021-11-19 PROCEDURE — 05H633Z INSERTION OF INFUSION DEVICE INTO LEFT SUBCLAVIAN VEIN, PERCUTANEOUS APPROACH: ICD-10-PCS | Performed by: NURSE PRACTITIONER

## 2021-11-19 PROCEDURE — B547ZZA ULTRASONOGRAPHY OF LEFT SUBCLAVIAN VEIN, GUIDANCE: ICD-10-PCS | Performed by: NURSE PRACTITIONER

## 2021-11-19 RX ADMIN — SODIUM CHLORIDE PRN MLS/HR: 9 INJECTION, SOLUTION INTRAVENOUS at 22:09

## 2021-11-19 RX ADMIN — POTASSIUM CHLORIDE SCH MEQ: 1500 TABLET, EXTENDED RELEASE ORAL at 09:59

## 2021-11-19 RX ADMIN — LACOSAMIDE SCH MLS/HR: 10 INJECTION INTRAVENOUS at 21:39

## 2021-11-19 RX ADMIN — DEXTROSE MONOHYDRATE SCH MLS/HR: 50 INJECTION, SOLUTION INTRAVENOUS at 23:41

## 2021-11-19 RX ADMIN — LACOSAMIDE SCH MLS/HR: 10 INJECTION INTRAVENOUS at 08:53

## 2021-11-19 RX ADMIN — DEXTROSE MONOHYDRATE SCH MLS/HR: 50 INJECTION, SOLUTION INTRAVENOUS at 14:05

## 2021-11-19 RX ADMIN — PIPERACILLIN SODIUM AND TAZOBACTAM SODIUM SCH MLS/HR: .375; 3 INJECTION, POWDER, LYOPHILIZED, FOR SOLUTION INTRAVENOUS at 10:03

## 2021-11-19 RX ADMIN — SODIUM CHLORIDE SCH MLS/HR: 9 INJECTION, SOLUTION INTRAVENOUS at 09:27

## 2021-11-19 RX ADMIN — ENOXAPARIN SODIUM SCH MG: 40 INJECTION SUBCUTANEOUS at 21:19

## 2021-11-19 RX ADMIN — PANTOPRAZOLE SODIUM SCH MG: 40 TABLET, DELAYED RELEASE ORAL at 08:16

## 2021-11-19 RX ADMIN — PIPERACILLIN SODIUM AND TAZOBACTAM SODIUM SCH MLS/HR: .375; 3 INJECTION, POWDER, LYOPHILIZED, FOR SOLUTION INTRAVENOUS at 17:55

## 2021-11-19 RX ADMIN — POTASSIUM CHLORIDE SCH MEQ: 1500 TABLET, EXTENDED RELEASE ORAL at 10:52

## 2021-11-19 RX ADMIN — SODIUM CHLORIDE SCH MLS/HR: 9 INJECTION, SOLUTION INTRAVENOUS at 21:18

## 2021-11-19 NOTE — NUR
RN OPENING NOTE



PATIENT RECEIVED IN BED, RESTING. PATIENT ON 2L O2 NC WITH NO SIGNS OF LABORED BREATHING AT 
THIS TIME. TELE MONITOR ON, SINUS RHYTHM AT THIS TIME. LEFT UA MIDLINE IN PLACE, PATENT WITH 
NO SIGNS OF INFILTRATION. NO SIGNS OF DISTRESS NOTED AT THIS TIME. BED LOCKED AND IN LOWEST 
POSITION, 2 SIDE RAILS UP, CALL LIGHT WITHIN REACH. WILL CONTINUE TO MONITOR.

## 2021-11-19 NOTE — NUR
RN NOTE



PHARMACY AWARE OF PATIENT LOW POTASSIUM LEVEL, MODIFIED IV POTASSIUM TO PO PILLS. WILL 
CONTINUE TO MONITOR.

## 2021-11-19 NOTE — NUR
RN NOTES



RECEIVED PT FOR MIGUEL. PATIENT IN NO S/SX OF ACUTE DISTRESS AT THIS TIME. WILL ENSURE SAFETY 
MEASURES WITHIN THE SHIFT. PATIENT BED ALARM IS ON. HEAD OF BED ELEVATED. BED IS LOCKED,  IN 
LOWEST POSITION AND SIDE RAILS UP. CALL LIGHT WITHIN REACH OF THE PATIENT. APPLICABLE 
ISOLATION PRECAUTIONS IN PLACE. WILL CONTINUE TO MONITOR AND REASSESS FOR ANY CHANGES AND 
WILL CARRY OUT ANY ONGOING AND ACTIVE MD ORDER.

## 2021-11-19 NOTE — NUR
RN CLOSING NOTE



PATIENT IN BED, AWAKE, A&OX3. PATIENT ON 3L O2 NC WITH NO SIGNS OF LABORED BREATHING AT THIS 
TIME. LEFT UPPERARM MIDLINE IN PLACE RUNNING NS AT 75CC/HR. NO SIGNS OF DISTRESS NOTED AT 
THIS TIME. BED LOCKED AND IN LOWEST POSITION, CALL LIGHT WITHIN REACH, 3 SIDE RAILS UP. ALL 
SAFETY MEASURES IMPLEMENTED. WILL ENDORSE TO NIGHT SHIFT NURSE.

## 2021-11-19 NOTE — NUR
RN NOTE



CRITICAL LAB VALUE OF POTASSIUM 2.7 RECEIVED FROM LAB. REPORTED TO DR. JOHNSON AWARE. 
POTASSIUM SUPPLEMENT ORDERED. WILL CONTINUE TO MONITOR.

## 2021-11-19 NOTE — NUR
RN CLOSING NOTE

PT RESTING IN BED, EASILY AROUSABLE TO STIMULI. ABLE TO MAKE NEEDS KNOWN. SHE DENIES ANY 
PAIN AT THIS TIME. RESPIRATIONS EVEN/UNLABORED. ON O2 @2LPM VIA NC. O2 SAT 98%. TELE MONITOR 
READING SR, HR 64. PT IN NO ACUTE DISTRESS. SAFETY MEASURES MAINTAINED. ALL NEEDS ATTENDED 
TO.

## 2021-11-20 VITALS — SYSTOLIC BLOOD PRESSURE: 125 MMHG | DIASTOLIC BLOOD PRESSURE: 82 MMHG

## 2021-11-20 VITALS — DIASTOLIC BLOOD PRESSURE: 71 MMHG | SYSTOLIC BLOOD PRESSURE: 127 MMHG

## 2021-11-20 VITALS — SYSTOLIC BLOOD PRESSURE: 119 MMHG | DIASTOLIC BLOOD PRESSURE: 72 MMHG

## 2021-11-20 VITALS — DIASTOLIC BLOOD PRESSURE: 81 MMHG | SYSTOLIC BLOOD PRESSURE: 134 MMHG

## 2021-11-20 VITALS — DIASTOLIC BLOOD PRESSURE: 82 MMHG | SYSTOLIC BLOOD PRESSURE: 141 MMHG

## 2021-11-20 VITALS — DIASTOLIC BLOOD PRESSURE: 78 MMHG | SYSTOLIC BLOOD PRESSURE: 150 MMHG

## 2021-11-20 LAB
BASOPHILS # BLD AUTO: 0.1 K/UL (ref 0–0.2)
BASOPHILS NFR BLD AUTO: 1.1 % (ref 0–2)
BUN SERPL-MCNC: 11 MG/DL (ref 7–18)
CALCIUM SERPL-MCNC: 8.9 MG/DL (ref 8.5–10.1)
CHLORIDE SERPL-SCNC: 104 MMOL/L (ref 98–107)
CO2 SERPL-SCNC: 30 MMOL/L (ref 21–32)
CREAT SERPL-MCNC: 1.2 MG/DL (ref 0.6–1.3)
EOSINOPHIL NFR BLD AUTO: 13 % (ref 0–6)
GLUCOSE SERPL-MCNC: 92 MG/DL (ref 74–106)
HCT VFR BLD AUTO: 34 % (ref 33–45)
HGB BLD-MCNC: 11.2 G/DL (ref 11.5–14.8)
LYMPHOCYTES NFR BLD AUTO: 1.9 K/UL (ref 0.8–4.8)
LYMPHOCYTES NFR BLD AUTO: 21.5 % (ref 20–44)
MCHC RBC AUTO-ENTMCNC: 33 G/DL (ref 31–36)
MCV RBC AUTO: 92 FL (ref 82–100)
MONOCYTES NFR BLD AUTO: 0.7 K/UL (ref 0.1–1.3)
MONOCYTES NFR BLD AUTO: 8.6 % (ref 2–12)
NEUTROPHILS # BLD AUTO: 4.8 K/UL (ref 1.8–8.9)
NEUTROPHILS NFR BLD AUTO: 55.8 % (ref 43–81)
PHOSPHATE SERPL-MCNC: 2.6 MG/DL (ref 2.5–4.9)
PLATELET # BLD AUTO: 329 K/UL (ref 150–450)
POTASSIUM SERPL-SCNC: 3.4 MMOL/L (ref 3.5–5.1)
RBC # BLD AUTO: 3.72 MIL/UL (ref 4–5.2)
SODIUM SERPL-SCNC: 144 MMOL/L (ref 136–145)
WBC NRBC COR # BLD AUTO: 8.6 K/UL (ref 4.3–11)

## 2021-11-20 RX ADMIN — ENOXAPARIN SODIUM SCH MG: 40 INJECTION SUBCUTANEOUS at 21:31

## 2021-11-20 RX ADMIN — PIPERACILLIN SODIUM AND TAZOBACTAM SODIUM SCH MLS/HR: .375; 3 INJECTION, POWDER, LYOPHILIZED, FOR SOLUTION INTRAVENOUS at 09:20

## 2021-11-20 RX ADMIN — BACLOFEN SCH MG: 10 TABLET ORAL at 13:35

## 2021-11-20 RX ADMIN — LACOSAMIDE SCH MLS/HR: 10 INJECTION INTRAVENOUS at 08:02

## 2021-11-20 RX ADMIN — GABAPENTIN SCH MG: 100 CAPSULE ORAL at 13:35

## 2021-11-20 RX ADMIN — DEXTROSE MONOHYDRATE SCH MLS/HR: 50 INJECTION, SOLUTION INTRAVENOUS at 13:41

## 2021-11-20 RX ADMIN — BACLOFEN SCH MG: 10 TABLET ORAL at 17:07

## 2021-11-20 RX ADMIN — Medication SCH ML: at 17:07

## 2021-11-20 RX ADMIN — ATORVASTATIN CALCIUM SCH MG: 10 TABLET, FILM COATED ORAL at 21:27

## 2021-11-20 RX ADMIN — GABAPENTIN SCH MG: 100 CAPSULE ORAL at 17:07

## 2021-11-20 RX ADMIN — FLUDROCORTISONE ACETATE SCH MG: 0.1 TABLET ORAL at 23:02

## 2021-11-20 RX ADMIN — Medication SCH MG: at 17:07

## 2021-11-20 RX ADMIN — SODIUM CHLORIDE SCH MLS/HR: 9 INJECTION, SOLUTION INTRAVENOUS at 21:32

## 2021-11-20 RX ADMIN — FLUDROCORTISONE ACETATE SCH MG: 0.1 TABLET ORAL at 17:08

## 2021-11-20 RX ADMIN — FLUDROCORTISONE ACETATE SCH MG: 0.1 TABLET ORAL at 12:41

## 2021-11-20 RX ADMIN — BACLOFEN SCH MG: 10 TABLET ORAL at 21:27

## 2021-11-20 RX ADMIN — SODIUM CHLORIDE PRN MLS/HR: 9 INJECTION, SOLUTION INTRAVENOUS at 17:24

## 2021-11-20 RX ADMIN — LACOSAMIDE SCH MLS/HR: 10 INJECTION INTRAVENOUS at 21:25

## 2021-11-20 RX ADMIN — DONEPEZIL HYDROCHLORIDE SCH MG: 5 TABLET ORAL at 21:27

## 2021-11-20 RX ADMIN — PIPERACILLIN SODIUM AND TAZOBACTAM SODIUM SCH MLS/HR: .375; 3 INJECTION, POWDER, LYOPHILIZED, FOR SOLUTION INTRAVENOUS at 01:17

## 2021-11-20 RX ADMIN — PIPERACILLIN SODIUM AND TAZOBACTAM SODIUM SCH MLS/HR: .375; 3 INJECTION, POWDER, LYOPHILIZED, FOR SOLUTION INTRAVENOUS at 17:08

## 2021-11-20 RX ADMIN — PANTOPRAZOLE SODIUM SCH MG: 40 TABLET, DELAYED RELEASE ORAL at 08:02

## 2021-11-20 RX ADMIN — SODIUM CHLORIDE SCH MLS/HR: 9 INJECTION, SOLUTION INTRAVENOUS at 08:41

## 2021-11-20 NOTE — NUR
RN OPENING NOTE



PATIENT RECEIVED IN BED, RESTING. PATIENT ON 3L O2 NC WITH NO SIGNS OF LABORED BREATHING AT 
THIS TIME. TELE MONITOR ON, SINUS RHYTHM. LEFT UA MIDLINE IN PLACE, PATENT WITH NO SIGNS OF 
INFILTRATION. NO SIGNS OF DISTRESS NOTED AT THIS TIME. BED LOCKED AND IN LOWEST POSITION, 3 
SIDE RAILS UP, CALL LIGHT WITHIN REACH. ALL SAFETY MEASURES. WILL CONTINUE TO MONITOR.

## 2021-11-20 NOTE — NUR
RN NOTES



RECEIVED PT FOR MIGUEL. PATIENT IN NO S/SX OF ACUTE DISTRESS AT THIS TIME; CURRENTLY ON 3L OF 
02 VIA NC. WILL ENSURE SAFETY MEASURES WITHIN THE SHIFT. PATIENT BED ALARM IS ON. HEAD OF 
BED ELEVATED. BED IS LOCKED,  IN LOWEST POSITION AND SIDE RAILS UP. CALL LIGHT WITHIN REACH 
OF THE PATIENT. APPLICABLE ISOLATION PRECAUTIONS IN PLACE. WILL CONTINUE TO MONITOR AND 
REASSESS FOR ANY CHANGES AND WILL CARRY OUT ANY ONGOING AND ACTIVE MD ORDER.

## 2021-11-20 NOTE — NUR
RN CLOSING NOTE



PATIENT IN BED, AWAKE, A&OX3. PATIENT ON 3L O2 NC WITH NO SIGNS OF LABORED BREATHING AT THIS 
TIME. TELE MONITOR ON, SINUS RHYTHM. LEFT UA MIDLINE IN PLACE, PATENT WITH NO SIGNS OF 
INFILTRATION. ALL NEEDS ATTENDED DURING SHIFT. NO SIGNS OF DISTRESS NOTED. BED LOCKED AND IN 
LOWEST POSITION, 3 SIDE RAILS UP, CALL LIGHT WITHIN REACH. ALL SAFETY MEASURES IMPLEMENTED. 
WILL ENDORSE TO NIGHT SHIFT NURSE.

## 2021-11-20 NOTE — NUR
RN CLOSING NOTE: 



PATIENT REMAINS IN ROOM IN NO SIGNS OF RESPIRATORY DISTRESS, PATIENT STILL ON 3L OF 02 VIA 
NC ;TOLERATING WELL SATURATING @ >95% SP02. SAFETY MEASURES IMPLEMENTED, BED IN LOWEST 
POSITION, LOCKED, SIDE RAILS UP, CALL LIGHT WITHIN REACH. ALL NEEDS AND ORDERS ADDRESSED 
DURING THE SHIFT. IV ACCESS MAINTAINED INTACT, SECURED AND FLUSHING WELL.  ALL DUE MEDS 
GIVEN AS ORDERED & SCHEDULED ; PATIENT TOLERATED WELL. PATIENT KEPT CLEAN AND COMFORTABLE 
WITHIN THE SHIFT. PATIENT ENDORSED TO INCOMING SHIFT RN WITH STABLE VITAL SIGN AND FOR 
CONTINUITY OF CARE.

## 2021-11-20 NOTE — NUR
RN NOTES



PATIENT REMAINED TO BE IN NO SIGNS OF ACUTE RESPIRATORY DISTRESS , VITAL SIGNS STABLE AT 
THIS TIME. REGULAR TURNING AND REPOSITIONING DONE Q2H. AM PATIENT CARE DONE. WILL CONTINUE 
TO MONITOR AND REASSESS FOR ANY CHANGES THROUGHOUT THE SHIFT.

## 2021-11-21 VITALS — DIASTOLIC BLOOD PRESSURE: 67 MMHG | SYSTOLIC BLOOD PRESSURE: 124 MMHG

## 2021-11-21 VITALS — DIASTOLIC BLOOD PRESSURE: 77 MMHG | SYSTOLIC BLOOD PRESSURE: 123 MMHG

## 2021-11-21 VITALS — SYSTOLIC BLOOD PRESSURE: 106 MMHG | DIASTOLIC BLOOD PRESSURE: 62 MMHG

## 2021-11-21 VITALS — SYSTOLIC BLOOD PRESSURE: 120 MMHG | DIASTOLIC BLOOD PRESSURE: 73 MMHG

## 2021-11-21 VITALS — SYSTOLIC BLOOD PRESSURE: 131 MMHG | DIASTOLIC BLOOD PRESSURE: 70 MMHG

## 2021-11-21 VITALS — DIASTOLIC BLOOD PRESSURE: 74 MMHG | SYSTOLIC BLOOD PRESSURE: 127 MMHG

## 2021-11-21 LAB
BASOPHILS # BLD AUTO: 0.1 K/UL (ref 0–0.2)
BASOPHILS NFR BLD AUTO: 1.1 % (ref 0–2)
BUN SERPL-MCNC: 10 MG/DL (ref 7–18)
CALCIUM SERPL-MCNC: 8.7 MG/DL (ref 8.5–10.1)
CHLORIDE SERPL-SCNC: 108 MMOL/L (ref 98–107)
CO2 SERPL-SCNC: 27 MMOL/L (ref 21–32)
CREAT SERPL-MCNC: 1.3 MG/DL (ref 0.6–1.3)
EOSINOPHIL NFR BLD AUTO: 15.9 % (ref 0–6)
GLUCOSE SERPL-MCNC: 92 MG/DL (ref 74–106)
HCT VFR BLD AUTO: 31 % (ref 33–45)
HGB BLD-MCNC: 10.2 G/DL (ref 11.5–14.8)
LYMPHOCYTES NFR BLD AUTO: 2.8 K/UL (ref 0.8–4.8)
LYMPHOCYTES NFR BLD AUTO: 27 % (ref 20–44)
MCHC RBC AUTO-ENTMCNC: 33 G/DL (ref 31–36)
MCV RBC AUTO: 93 FL (ref 82–100)
MONOCYTES NFR BLD AUTO: 0.7 K/UL (ref 0.1–1.3)
MONOCYTES NFR BLD AUTO: 7.1 % (ref 2–12)
NEUTROPHILS # BLD AUTO: 5 K/UL (ref 1.8–8.9)
NEUTROPHILS NFR BLD AUTO: 48.9 % (ref 43–81)
PLATELET # BLD AUTO: 286 K/UL (ref 150–450)
POTASSIUM SERPL-SCNC: 2.7 MMOL/L (ref 3.5–5.1)
RBC # BLD AUTO: 3.39 MIL/UL (ref 4–5.2)
SODIUM SERPL-SCNC: 146 MMOL/L (ref 136–145)
WBC NRBC COR # BLD AUTO: 10.3 K/UL (ref 4.3–11)

## 2021-11-21 RX ADMIN — ASPIRIN 81 MG SCH MG: 81 TABLET ORAL at 09:15

## 2021-11-21 RX ADMIN — POTASSIUM CHLORIDE SCH MLS/HR: 200 INJECTION, SOLUTION INTRAVENOUS at 21:32

## 2021-11-21 RX ADMIN — ATORVASTATIN CALCIUM SCH MG: 10 TABLET, FILM COATED ORAL at 21:16

## 2021-11-21 RX ADMIN — FLUDROCORTISONE ACETATE SCH MG: 0.1 TABLET ORAL at 12:22

## 2021-11-21 RX ADMIN — POTASSIUM CHLORIDE SCH MLS/HR: 200 INJECTION, SOLUTION INTRAVENOUS at 16:31

## 2021-11-21 RX ADMIN — PIPERACILLIN SODIUM AND TAZOBACTAM SODIUM SCH MLS/HR: .375; 3 INJECTION, POWDER, LYOPHILIZED, FOR SOLUTION INTRAVENOUS at 02:02

## 2021-11-21 RX ADMIN — GABAPENTIN SCH MG: 100 CAPSULE ORAL at 16:31

## 2021-11-21 RX ADMIN — LEVETIRACETAM SCH MG: 250 TABLET, FILM COATED ORAL at 21:16

## 2021-11-21 RX ADMIN — Medication SCH MG: at 09:15

## 2021-11-21 RX ADMIN — FLUDROCORTISONE ACETATE SCH MG: 0.1 TABLET ORAL at 18:03

## 2021-11-21 RX ADMIN — AMLODIPINE BESYLATE SCH MG: 10 TABLET ORAL at 09:15

## 2021-11-21 RX ADMIN — BACLOFEN SCH MG: 10 TABLET ORAL at 12:22

## 2021-11-21 RX ADMIN — Medication SCH ML: at 09:14

## 2021-11-21 RX ADMIN — Medication SCH ML: at 17:29

## 2021-11-21 RX ADMIN — BACLOFEN SCH MG: 10 TABLET ORAL at 21:16

## 2021-11-21 RX ADMIN — BACLOFEN SCH MG: 10 TABLET ORAL at 16:31

## 2021-11-21 RX ADMIN — GABAPENTIN SCH MG: 100 CAPSULE ORAL at 12:22

## 2021-11-21 RX ADMIN — VENLAFAXINE HYDROCHLORIDE SCH MG: 37.5 CAPSULE, EXTENDED RELEASE ORAL at 10:19

## 2021-11-21 RX ADMIN — Medication SCH MG: at 16:31

## 2021-11-21 RX ADMIN — POTASSIUM CHLORIDE SCH MLS/HR: 200 INJECTION, SOLUTION INTRAVENOUS at 10:19

## 2021-11-21 RX ADMIN — SODIUM CHLORIDE PRN MLS/HR: 9 INJECTION, SOLUTION INTRAVENOUS at 06:01

## 2021-11-21 RX ADMIN — DONEPEZIL HYDROCHLORIDE SCH MG: 5 TABLET ORAL at 21:16

## 2021-11-21 RX ADMIN — POTASSIUM CHLORIDE SCH MLS/HR: 200 INJECTION, SOLUTION INTRAVENOUS at 22:57

## 2021-11-21 RX ADMIN — Medication SCH ML: at 15:32

## 2021-11-21 RX ADMIN — POTASSIUM CHLORIDE SCH MLS/HR: 200 INJECTION, SOLUTION INTRAVENOUS at 12:44

## 2021-11-21 RX ADMIN — LEVETIRACETAM SCH MG: 250 TABLET, FILM COATED ORAL at 09:15

## 2021-11-21 RX ADMIN — LACOSAMIDE SCH MLS/HR: 10 INJECTION INTRAVENOUS at 09:14

## 2021-11-21 RX ADMIN — BACLOFEN SCH MG: 10 TABLET ORAL at 09:15

## 2021-11-21 RX ADMIN — PANTOPRAZOLE SODIUM SCH MG: 40 TABLET, DELAYED RELEASE ORAL at 07:42

## 2021-11-21 RX ADMIN — LACOSAMIDE SCH MG: 50 TABLET, FILM COATED ORAL at 21:16

## 2021-11-21 RX ADMIN — POTASSIUM CHLORIDE SCH MLS/HR: 200 INJECTION, SOLUTION INTRAVENOUS at 18:03

## 2021-11-21 RX ADMIN — PIPERACILLIN SODIUM AND TAZOBACTAM SODIUM SCH MLS/HR: .375; 3 INJECTION, POWDER, LYOPHILIZED, FOR SOLUTION INTRAVENOUS at 18:03

## 2021-11-21 RX ADMIN — FLUDROCORTISONE ACETATE SCH MG: 0.1 TABLET ORAL at 05:42

## 2021-11-21 RX ADMIN — POTASSIUM CHLORIDE SCH MLS/HR: 200 INJECTION, SOLUTION INTRAVENOUS at 15:28

## 2021-11-21 RX ADMIN — Medication SCH ML: at 18:04

## 2021-11-21 RX ADMIN — FAMOTIDINE SCH MG: 20 TABLET, FILM COATED ORAL at 07:42

## 2021-11-21 RX ADMIN — ENOXAPARIN SODIUM SCH MG: 40 INJECTION SUBCUTANEOUS at 21:17

## 2021-11-21 RX ADMIN — GABAPENTIN SCH MG: 100 CAPSULE ORAL at 09:17

## 2021-11-21 RX ADMIN — PIPERACILLIN SODIUM AND TAZOBACTAM SODIUM SCH MLS/HR: .375; 3 INJECTION, POWDER, LYOPHILIZED, FOR SOLUTION INTRAVENOUS at 10:21

## 2021-11-21 NOTE — NUR
RNCLOSING NOTE

PATIENT REMAINS IN ROOM IN NO SIGNS OF RESPIRATORY DISTRESS, PATIENT STILL ON 3L OF 02 VIA 
NC ;TOLERATING WELL SATURATING @ >95% SP02. PT HAS 2X LOOSE BM. SAFETY MEASURES IMPLEMENTED, 
BED IN LOWEST POSITION, LOCKED, SIDE RAILS UP, CALL LIGHT WITHIN REACH. ALL NEEDS AND ORDERS 
ADDRESSED DURING THE SHIFT. IV ACCESS MAINTAINED INTACT, SECURED AND FLUSHING WELL. ALL DUE 
MEDS GIVEN AS ORDERED & SCHEDULED ; PATIENT TOLERATED WELL. PATIENT KEPT CLEAN AND 
COMFORTABLE WITHIN THE SHIFT. PATIENT ENDORSED TO INCOMING SHIFT RN WITH STABLE VITAL SIGN 
AND FOR CONTINUITY OF CARE.

## 2021-11-21 NOTE — NUR
RN NOTES

PT NOTED WITH CONSISTENT ST ELEVATION, CHANGE IN RHYTHM. LATEST POTASSIUM VALUE IS 2.7. 
NOTIFIED ON CALL AMMY GARCIA DNP. POTASSIUM CHLORIDE WILL BE CONTINUED PER DNP ORDERS.

## 2021-11-21 NOTE — NUR
RN NOTES



RECEIVED PT FOR MIGUEL. PATIENT IN NO S/SX OF ACUTE DISTRESS AT THIS TIME; CURRENTLY ON 3L OF 
02 VIA NC. WILL ENSURE SAFETY MEASURES WITHIN THE SHIFT. LEFT UA MIDLINE PATENT  AND INTACT 
NS @75 RUNNING. PATIENT BED ALARM IS ON. HEAD OF BED ELEVATED. BED IS LOCKED,  IN LOWEST 
POSITION AND SIDE RAILS UP. CALL LIGHT WITHIN REACH OF THE PATIENT. APPLICABLE ISOLATION 
PRECAUTIONS IN PLACE. WILL CONTINUE TO MONITOR AND REASSESS FOR ANY CHANGES AND WILL CARRY 
OUT ANY ONGOING AND ACTIVE MD ORDER.

## 2021-11-22 ENCOUNTER — HOSPITAL ENCOUNTER (EMERGENCY)
Dept: HOSPITAL 54 - ER | Age: 56
Discharge: LEFT BEFORE DISCHARGE | End: 2021-11-22
Payer: MEDICARE

## 2021-11-22 VITALS — SYSTOLIC BLOOD PRESSURE: 107 MMHG | DIASTOLIC BLOOD PRESSURE: 65 MMHG

## 2021-11-22 VITALS — BODY MASS INDEX: 23.82 KG/M2 | WEIGHT: 143 LBS | HEIGHT: 65 IN

## 2021-11-22 VITALS — DIASTOLIC BLOOD PRESSURE: 65 MMHG | SYSTOLIC BLOOD PRESSURE: 117 MMHG

## 2021-11-22 VITALS — DIASTOLIC BLOOD PRESSURE: 41 MMHG | SYSTOLIC BLOOD PRESSURE: 105 MMHG

## 2021-11-22 VITALS — SYSTOLIC BLOOD PRESSURE: 132 MMHG | DIASTOLIC BLOOD PRESSURE: 67 MMHG

## 2021-11-22 VITALS — DIASTOLIC BLOOD PRESSURE: 75 MMHG | SYSTOLIC BLOOD PRESSURE: 113 MMHG

## 2021-11-22 DIAGNOSIS — Z53.21: Primary | ICD-10-CM

## 2021-11-22 LAB
BASOPHILS # BLD AUTO: 0.1 K/UL (ref 0–0.2)
BASOPHILS NFR BLD AUTO: 0.9 % (ref 0–2)
BUN SERPL-MCNC: 8 MG/DL (ref 7–18)
BUN SERPL-MCNC: 8 MG/DL (ref 7–18)
CALCIUM SERPL-MCNC: 8.7 MG/DL (ref 8.5–10.1)
CALCIUM SERPL-MCNC: 8.9 MG/DL (ref 8.5–10.1)
CHLORIDE SERPL-SCNC: 110 MMOL/L (ref 98–107)
CHLORIDE SERPL-SCNC: 111 MMOL/L (ref 98–107)
CO2 SERPL-SCNC: 27 MMOL/L (ref 21–32)
CO2 SERPL-SCNC: 29 MMOL/L (ref 21–32)
CREAT SERPL-MCNC: 1.2 MG/DL (ref 0.6–1.3)
CREAT SERPL-MCNC: 1.2 MG/DL (ref 0.6–1.3)
EOSINOPHIL NFR BLD AUTO: 13.9 % (ref 0–6)
GLUCOSE SERPL-MCNC: 84 MG/DL (ref 74–106)
GLUCOSE SERPL-MCNC: 89 MG/DL (ref 74–106)
HCT VFR BLD AUTO: 32 % (ref 33–45)
HGB BLD-MCNC: 10.5 G/DL (ref 11.5–14.8)
LYMPHOCYTES NFR BLD AUTO: 1.7 K/UL (ref 0.8–4.8)
LYMPHOCYTES NFR BLD AUTO: 19.9 % (ref 20–44)
MCHC RBC AUTO-ENTMCNC: 33 G/DL (ref 31–36)
MCV RBC AUTO: 92 FL (ref 82–100)
MONOCYTES NFR BLD AUTO: 0.7 K/UL (ref 0.1–1.3)
MONOCYTES NFR BLD AUTO: 8.5 % (ref 2–12)
NEUTROPHILS # BLD AUTO: 4.9 K/UL (ref 1.8–8.9)
NEUTROPHILS NFR BLD AUTO: 56.8 % (ref 43–81)
PLATELET # BLD AUTO: 306 K/UL (ref 150–450)
POTASSIUM SERPL-SCNC: 4.1 MMOL/L (ref 3.5–5.1)
POTASSIUM SERPL-SCNC: 4.4 MMOL/L (ref 3.5–5.1)
RBC # BLD AUTO: 3.5 MIL/UL (ref 4–5.2)
SODIUM SERPL-SCNC: 145 MMOL/L (ref 136–145)
SODIUM SERPL-SCNC: 147 MMOL/L (ref 136–145)
WBC NRBC COR # BLD AUTO: 8.7 K/UL (ref 4.3–11)

## 2021-11-22 RX ADMIN — Medication SCH MG: at 10:53

## 2021-11-22 RX ADMIN — GABAPENTIN SCH MG: 100 CAPSULE ORAL at 13:00

## 2021-11-22 RX ADMIN — VENLAFAXINE HYDROCHLORIDE SCH MG: 37.5 CAPSULE, EXTENDED RELEASE ORAL at 11:04

## 2021-11-22 RX ADMIN — Medication SCH ML: at 09:00

## 2021-11-22 RX ADMIN — FLUDROCORTISONE ACETATE SCH MG: 0.1 TABLET ORAL at 00:27

## 2021-11-22 RX ADMIN — POTASSIUM CHLORIDE SCH MLS/HR: 200 INJECTION, SOLUTION INTRAVENOUS at 01:41

## 2021-11-22 RX ADMIN — PANTOPRAZOLE SODIUM SCH MG: 40 TABLET, DELAYED RELEASE ORAL at 10:54

## 2021-11-22 RX ADMIN — GABAPENTIN SCH MG: 100 CAPSULE ORAL at 10:54

## 2021-11-22 RX ADMIN — AMLODIPINE BESYLATE SCH MG: 10 TABLET ORAL at 09:00

## 2021-11-22 RX ADMIN — ASPIRIN 81 MG SCH MG: 81 TABLET ORAL at 10:53

## 2021-11-22 RX ADMIN — BACLOFEN SCH MG: 10 TABLET ORAL at 13:00

## 2021-11-22 RX ADMIN — FLUDROCORTISONE ACETATE SCH MG: 0.1 TABLET ORAL at 06:00

## 2021-11-22 RX ADMIN — BACLOFEN SCH MG: 10 TABLET ORAL at 10:54

## 2021-11-22 RX ADMIN — FLUDROCORTISONE ACETATE SCH MG: 0.1 TABLET ORAL at 13:00

## 2021-11-22 RX ADMIN — Medication SCH ML: at 13:01

## 2021-11-22 RX ADMIN — PIPERACILLIN SODIUM AND TAZOBACTAM SODIUM SCH MLS/HR: .375; 3 INJECTION, POWDER, LYOPHILIZED, FOR SOLUTION INTRAVENOUS at 10:59

## 2021-11-22 RX ADMIN — FAMOTIDINE SCH MG: 20 TABLET, FILM COATED ORAL at 10:54

## 2021-11-22 RX ADMIN — Medication SCH ML: at 10:53

## 2021-11-22 RX ADMIN — LEVETIRACETAM SCH MG: 250 TABLET, FILM COATED ORAL at 10:57

## 2021-11-22 RX ADMIN — PIPERACILLIN SODIUM AND TAZOBACTAM SODIUM SCH MLS/HR: .375; 3 INJECTION, POWDER, LYOPHILIZED, FOR SOLUTION INTRAVENOUS at 02:30

## 2021-11-22 RX ADMIN — LACOSAMIDE SCH MG: 50 TABLET, FILM COATED ORAL at 10:53

## 2021-11-22 NOTE — NUR
Private ambulance came and picked up the patient going back to [facility] and rehab in no distress, v/s stable. Called report and spoke to RN on 
duty. Complex Repair And W Plasty Text: The defect edges were debeveled with a #15 scalpel blade.  The primary defect was closed partially with a complex linear closure.  Given the location of the remaining defect, shape of the defect and the proximity to free margins a W plasty was deemed most appropriate for complete closure of the defect.  Using a sterile surgical marker, an appropriate advancement flap was drawn incorporating the defect and placing the expected incisions within the relaxed skin tension lines where possible.    The area thus outlined was incised deep to adipose tissue with a #15 scalpel blade.  The skin margins were undermined to an appropriate distance in all directions utilizing iris scissors.

## 2021-11-22 NOTE — NUR
RN NOTE-EKG



PT NOTED WITH CONSISTENT ST ELEVATION, CHANGE IN RHYTHM. EKG ORDERED STAT. ACUTE MI NOTED, 
NOTIFIED ON CALL AMMY GARCIA OF RESULTS, PAGED ON CALL CARDIO DR GUPTA. AMMY GARCIA 
DNP THEN SPOKE ON PHONE WITH DR GUPTA. 



NO NEW ORDERS FROM BOTH MDs AT THIS TIME. LABS TO BE DRAWN; BMP AND TROPONIN. CONTINUE TO 
MONITOR. FOLLOW UP WITH CARDIO IN THE MORNING.

## 2021-11-22 NOTE — NUR
RN NOTE

PATIENT IN BED, NON-VERBAL, OPENS EYE TO EXTERNAL STIMULI, ON O2 VIA NC @ 2LPM 02 SAT OF 
100% BREATHING EVEN AND UNLABORED, TOLERATING WELL, ON TELE MONITOR ST ELEVATION NOTED, NOT 
IN DISTRESS, WILL CONTINUE TO MONITOR, BED WHEELS LOCK, CALL LIGHT WITHIN REACH, SAFETY 
MEASURES OBSERVED.

## 2021-11-22 NOTE — NUR
RN NOTE



PT NOTED TO BE DROOLING MORE, NOTED WITH FACIAL TWITCHING. DIFFICULT TO AROUSE. PT ONLY 
WAKES UP/OPENS EYES FOR FEW SECONDS BEFORE DRIFTING BACK TO SLEEP. UNABLE TO ADMIN 0000 
MEDICATION, AS PT IS NOT ALERT. NOTIFIED ON CALL L[place]IS GARCIA DNP NO NEW ORDERS AT THIS TIME.

## 2021-11-22 NOTE — NUR
RN NOTE



PT REMAINS SAME, ST ELEVATION ON TELE MONITOR, HR IN 60S TROPONIN NOTED TO BE NEGATIVE, PT 
TO FOLLOW UP WITH CARDIO IN AM. PT NOT IN DISTRESS. NO BM NOTED. PT AFEBRILE, NO S/S OF 
PAIN, VSS. PT COMPLETED POTASSIUM REPLACEMENT. NOW K+ 4.4 ALL SAFETY MEASURES IN PLACE, HOB 
ELEVATED SIDE RAILS UP. BED LOCKED IN LOWEST POSITION. WILL ENDORSE TO DAY SHIFT RN FOR 
CONTINUATION OF CARE.

## 2021-11-22 NOTE — NUR
RN NOTE

NOTIFIED MD THAT PATIENT IS UNABLE TO TAKE MEDICATION AT THIS TIME, PATIENT IS ASLEEP BUT 
ABLE TO BE WAKEN UP AND REFUSING TO TAKE MEDICATION AT THIS TIME, PER MD OK TO ADMINISTER 
MEDICATION LATE IF PATIENT IS READY TO TAKE MEDICATION.

## 2022-08-13 ENCOUNTER — HOSPITAL ENCOUNTER (INPATIENT)
Dept: HOSPITAL 54 - ER | Age: 57
LOS: 6 days | DRG: 871 | End: 2022-08-19
Attending: INTERNAL MEDICINE | Admitting: NURSE PRACTITIONER
Payer: MEDICARE

## 2022-08-13 VITALS — SYSTOLIC BLOOD PRESSURE: 86 MMHG | DIASTOLIC BLOOD PRESSURE: 48 MMHG

## 2022-08-13 VITALS — BODY MASS INDEX: 18.32 KG/M2 | HEIGHT: 66 IN | WEIGHT: 114 LBS

## 2022-08-13 VITALS — DIASTOLIC BLOOD PRESSURE: 50 MMHG | SYSTOLIC BLOOD PRESSURE: 81 MMHG

## 2022-08-13 DIAGNOSIS — J96.01: ICD-10-CM

## 2022-08-13 DIAGNOSIS — I25.10: ICD-10-CM

## 2022-08-13 DIAGNOSIS — R65.21: ICD-10-CM

## 2022-08-13 DIAGNOSIS — I10: ICD-10-CM

## 2022-08-13 DIAGNOSIS — Z74.09: ICD-10-CM

## 2022-08-13 DIAGNOSIS — N13.6: ICD-10-CM

## 2022-08-13 DIAGNOSIS — T38.0X5A: ICD-10-CM

## 2022-08-13 DIAGNOSIS — G89.29: ICD-10-CM

## 2022-08-13 DIAGNOSIS — N26.1: ICD-10-CM

## 2022-08-13 DIAGNOSIS — G35: ICD-10-CM

## 2022-08-13 DIAGNOSIS — K56.41: ICD-10-CM

## 2022-08-13 DIAGNOSIS — E87.6: ICD-10-CM

## 2022-08-13 DIAGNOSIS — M62.421: ICD-10-CM

## 2022-08-13 DIAGNOSIS — F32.A: ICD-10-CM

## 2022-08-13 DIAGNOSIS — J69.0: ICD-10-CM

## 2022-08-13 DIAGNOSIS — Z86.73: ICD-10-CM

## 2022-08-13 DIAGNOSIS — N17.0: ICD-10-CM

## 2022-08-13 DIAGNOSIS — Z51.5: ICD-10-CM

## 2022-08-13 DIAGNOSIS — E87.0: ICD-10-CM

## 2022-08-13 DIAGNOSIS — F03.90: ICD-10-CM

## 2022-08-13 DIAGNOSIS — E87.1: ICD-10-CM

## 2022-08-13 DIAGNOSIS — A41.50: Primary | ICD-10-CM

## 2022-08-13 DIAGNOSIS — R13.10: ICD-10-CM

## 2022-08-13 DIAGNOSIS — E86.0: ICD-10-CM

## 2022-08-13 DIAGNOSIS — G40.909: ICD-10-CM

## 2022-08-13 DIAGNOSIS — Z79.82: ICD-10-CM

## 2022-08-13 DIAGNOSIS — B96.89: ICD-10-CM

## 2022-08-13 DIAGNOSIS — E11.42: ICD-10-CM

## 2022-08-13 DIAGNOSIS — N21.0: ICD-10-CM

## 2022-08-13 DIAGNOSIS — D68.59: ICD-10-CM

## 2022-08-13 DIAGNOSIS — G92.8: ICD-10-CM

## 2022-08-13 DIAGNOSIS — E78.5: ICD-10-CM

## 2022-08-13 DIAGNOSIS — N12: ICD-10-CM

## 2022-08-13 DIAGNOSIS — G82.50: ICD-10-CM

## 2022-08-13 DIAGNOSIS — D65: ICD-10-CM

## 2022-08-13 DIAGNOSIS — Z20.822: ICD-10-CM

## 2022-08-13 DIAGNOSIS — Z89.611: ICD-10-CM

## 2022-08-13 DIAGNOSIS — E27.40: ICD-10-CM

## 2022-08-13 DIAGNOSIS — Z66: ICD-10-CM

## 2022-08-13 DIAGNOSIS — E87.2: ICD-10-CM

## 2022-08-13 DIAGNOSIS — I21.A1: ICD-10-CM

## 2022-08-13 DIAGNOSIS — E44.0: ICD-10-CM
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
## 2022-08-13 DIAGNOSIS — D64.9: ICD-10-CM

## 2022-08-13 DIAGNOSIS — Z79.899: ICD-10-CM

## 2022-08-13 DIAGNOSIS — E88.09: ICD-10-CM

## 2022-08-13 DIAGNOSIS — K57.30: ICD-10-CM

## 2022-08-13 DIAGNOSIS — Z88.2: ICD-10-CM

## 2022-08-13 DIAGNOSIS — B96.5: ICD-10-CM

## 2022-08-13 DIAGNOSIS — R47.01: ICD-10-CM

## 2022-08-13 LAB
ALBUMIN SERPL BCP-MCNC: 2.9 G/DL (ref 3.4–5)
ALP SERPL-CCNC: 131 U/L (ref 46–116)
ALT SERPL W P-5'-P-CCNC: 34 U/L (ref 12–78)
AST SERPL W P-5'-P-CCNC: 43 U/L (ref 15–37)
BASE EXCESS BLDA CALC-SCNC: -7.1 MMOL/L
BASOPHILS # BLD AUTO: 0.1 K/UL (ref 0–0.2)
BASOPHILS NFR BLD AUTO: 0.2 % (ref 0–2)
BILIRUB DIRECT SERPL-MCNC: 0.2 MG/DL (ref 0–0.2)
BILIRUB SERPL-MCNC: 0.4 MG/DL (ref 0.2–1)
BUN SERPL-MCNC: 25 MG/DL (ref 7–18)
CALCIUM SERPL-MCNC: 8.8 MG/DL (ref 8.5–10.1)
CHLORIDE SERPL-SCNC: 99 MMOL/L (ref 98–107)
CO2 SERPL-SCNC: 26 MMOL/L (ref 21–32)
CREAT SERPL-MCNC: 2.5 MG/DL (ref 0.6–1.3)
DO-HGB MFR BLDA: 48.4 MMHG
EOSINOPHIL NFR BLD AUTO: 0.1 % (ref 0–6)
GLUCOSE SERPL-MCNC: 115 MG/DL (ref 74–106)
HCT VFR BLD AUTO: 36 % (ref 33–45)
HGB BLD-MCNC: 12.1 G/DL (ref 11.5–14.8)
INHALED O2 CONCENTRATION: 21 %
LYMPHOCYTES NFR BLD AUTO: 0.6 K/UL (ref 0.8–4.8)
LYMPHOCYTES NFR BLD AUTO: 2.4 % (ref 20–44)
LYMPHOCYTES NFR BLD MANUAL: 3 % (ref 16–48)
MCHC RBC AUTO-ENTMCNC: 34 G/DL (ref 31–36)
MCV RBC AUTO: 91 FL (ref 82–100)
MONOCYTES NFR BLD AUTO: 1.5 K/UL (ref 0.1–1.3)
MONOCYTES NFR BLD AUTO: 6.5 % (ref 2–12)
MONOCYTES NFR BLD MANUAL: 6 % (ref 0–11)
NEUTROPHILS # BLD AUTO: 21.4 K/UL (ref 1.8–8.9)
NEUTROPHILS NFR BLD AUTO: 90.8 % (ref 43–81)
NEUTS SEG NFR BLD MANUAL: 91 % (ref 42–76)
PCO2 TEMP ADJ BLDA: 33.3 MMHG (ref 35–45)
PH TEMP ADJ BLDA: 7.34 [PH] (ref 7.35–7.45)
PLATELET # BLD AUTO: 203 K/UL (ref 150–450)
PO2 TEMP ADJ BLDA: 61.5 MMHG (ref 75–100)
POTASSIUM SERPL-SCNC: 3.6 MMOL/L (ref 3.5–5.1)
PROT SERPL-MCNC: 6.9 G/DL (ref 6.4–8.2)
RBC # BLD AUTO: 3.93 MIL/UL (ref 4–5.2)
SAO2 % BLDA: 91.1 % (ref 92–98.5)
SODIUM SERPL-SCNC: 134 MMOL/L (ref 136–145)
VENTILATION MODE VENT: (no result)
WBC NRBC COR # BLD AUTO: 23.5 K/UL (ref 4.3–11)

## 2022-08-13 PROCEDURE — P9034 PLATELETS, PHERESIS: HCPCS

## 2022-08-13 PROCEDURE — A4216 STERILE WATER/SALINE, 10 ML: HCPCS

## 2022-08-13 PROCEDURE — P9047 ALBUMIN (HUMAN), 25%, 50ML: HCPCS

## 2022-08-13 PROCEDURE — G0378 HOSPITAL OBSERVATION PER HR: HCPCS

## 2022-08-13 PROCEDURE — P9016 RBC LEUKOCYTES REDUCED: HCPCS

## 2022-08-13 PROCEDURE — C9803 HOPD COVID-19 SPEC COLLECT: HCPCS

## 2022-08-13 RX ADMIN — Medication SCH MG: at 09:00

## 2022-08-13 RX ADMIN — GABAPENTIN SCH MG: 100 CAPSULE ORAL at 09:00

## 2022-08-13 RX ADMIN — ASPIRIN 81 MG SCH MG: 81 TABLET ORAL at 09:00

## 2022-08-13 RX ADMIN — FLUDROCORTISONE ACETATE SCH MG: 0.1 TABLET ORAL at 11:18

## 2022-08-13 RX ADMIN — HEPARIN SODIUM SCH UNITS: 5000 INJECTION INTRAVENOUS; SUBCUTANEOUS at 11:25

## 2022-08-13 RX ADMIN — LACOSAMIDE SCH MG: 50 TABLET, FILM COATED ORAL at 21:00

## 2022-08-13 RX ADMIN — FLUDROCORTISONE ACETATE SCH MG: 0.1 TABLET ORAL at 17:01

## 2022-08-13 RX ADMIN — OXYCODONE HYDROCHLORIDE AND ACETAMINOPHEN SCH MG: 500 TABLET ORAL at 09:00

## 2022-08-13 RX ADMIN — GABAPENTIN SCH MG: 100 CAPSULE ORAL at 16:28

## 2022-08-13 RX ADMIN — DEXTROSE AND SODIUM CHLORIDE SCH MLS/HR: 5; 900 INJECTION, SOLUTION INTRAVENOUS at 17:00

## 2022-08-13 RX ADMIN — HEPARIN SODIUM SCH UNITS: 5000 INJECTION INTRAVENOUS; SUBCUTANEOUS at 17:01

## 2022-08-13 RX ADMIN — BACLOFEN SCH MG: 10 TABLET ORAL at 13:00

## 2022-08-13 RX ADMIN — BACLOFEN SCH MG: 10 TABLET ORAL at 09:00

## 2022-08-13 RX ADMIN — Medication SCH MG: at 16:28

## 2022-08-13 RX ADMIN — AMLODIPINE BESYLATE SCH MG: 10 TABLET ORAL at 09:00

## 2022-08-13 RX ADMIN — GABAPENTIN SCH MG: 100 CAPSULE ORAL at 13:00

## 2022-08-13 RX ADMIN — Medication SCH ML: at 17:06

## 2022-08-13 RX ADMIN — BACLOFEN SCH MG: 10 TABLET ORAL at 16:28

## 2022-08-13 RX ADMIN — BACLOFEN SCH MG: 10 TABLET ORAL at 21:00

## 2022-08-13 NOTE — NUR
RN NOTE

WITH CONSISTENT TACHYCARDIA RANGING FROM 150-160 BPM; DR. CAZARES NOTIFIED. WITH ORDER MADE 
AND CARRIED OUT.

## 2022-08-13 NOTE — NUR
SHANDA NOTE

WITH CONSISTENT TACHYCARDIA RANGING FROM 150-160 BPM; DR. CAZARES NOTIFIED. WITH ORDER MADE 
AND CARRIED OUT.

-------------------------------------------------------------------------------

Addendum: 08/14/22 at 0347 by YARITZA SUMMERS RN

-------------------------------------------------------------------------------

WRONG TIMING. FROM 6427 TO 7405.

## 2022-08-13 NOTE — NUR
TELE RN NOTES



PATIENT LUNG SOUNDS CLEAR AT THIS TIME O-L Flap Text: The defect edges were debeveled with a #15 scalpel blade.  Given the location of the defect, shape of the defect and the proximity to free margins an O-L flap was deemed most appropriate.  Using a sterile surgical marker, an appropriate advancement flap was drawn incorporating the defect and placing the expected incisions within the relaxed skin tension lines where possible.    The area thus outlined was incised deep to adipose tissue with a #15 scalpel blade.  The skin margins were undermined to an appropriate distance in all directions utilizing iris scissors.

## 2022-08-13 NOTE — NUR
MS RN CLOSING NOTES



PATIENT LAYING IN BED, A/O X 0, TOLERATING WELL ON ROOM AIR WITH NO S/S RESPIRATORY 
DISTRESS. TELE MONITOR IN PLACE READING SINUS TACH 150. R FA #18 IV CLEAN, INTACT, AND 
INFUSING D5 NS @ 100 ML/HR. TWO # 20 G SL IN R HAND CLEAN, INTACT, AND FLUSHING WELL. SAFETY 
MEASURES IN PLACE: BED IN LOWEST LOCKED POSITION, SIDE RAILS UP X 2, CALL LIGHT WITHIN 
REACH. TURNED Q2H. ALL NEEDS MET. WILL ENDORSE TO NIGHT SHIFT FOR MIGUEL. 

-------------------------------------------------------------------------------

Addendum: 08/14/22 at 0742 by BRIAN LYNN RN

-------------------------------------------------------------------------------

HOB KEPT AT 40 DEGREES THROUGHOUT SHIFT

## 2022-08-13 NOTE — NUR
TELE RN OPENING NOTE

RECEIVED PATIENT IN BED; A/O X0, NONVERBAL, LETHARGIC AND DIFFICULT TO AROUSE. RESPIRATION 
EVEN AND NONLABORED. ON ROOM AIR; SATURATING @ 97%. WITH IV ACCESS ON LEFT HAND 20G, LEFT 
WRIST 20G AND LEFT ANTECUBITAL 18G; PATENT AND INTACT. WITH IV INFUSION OF D5NS 1L ON LAC 
REGULATED @ 100 ML/HR; FLUSHES WELL. NEEDS ANTICIPATED AND ATTENDED. SAFETY MEASURES 
IMPLEMENTED: HEAD OF BED ELEVATED, CALL LIGHT AND TABLE WITHIN REACH, SIDE RAILS UP X2, BED 
IN LOWEST LOCKED POSITION. WILL CONTINUE TO MONITOR

## 2022-08-13 NOTE — NUR
TELE RN ADMITTING NOTES



PATIENT BROUGHT TO UNIT 3W VIA GURNEY AND TRANSFERRED TO BED WITHOUT INCIDENT. A/O X 0, 
AROUSABLE TO TOUCH, WITH WHEEZING RESPIRATIONS. PATIENT PRESENTING WITH SEPSIS. V/S TAKEN 
UPON ADMISSION: /62, , T 97.8 AXILLARY, RR 18, O2 95% ON ROOM AIR. TELE MONITOR 
PLACED AND READING SR. SAFETY MEASURES IN PLACE: BED IN LOWEST LOCKED POSITION, SIDE RAILS 
UP X 2, CALL LIGHT WITHIN REACH WILL CONTINUE TO MONITOR. 

-------------------------------------------------------------------------------

Addendum: 08/13/22 at 1945 by BRIAN LYNN RN

-------------------------------------------------------------------------------

UPON ADMISSION, PATIENT WAS NOT AROUSABLE TO TOUCH

## 2022-08-13 NOTE — NUR
[id number] FROM SNF, PT MORE ALTERED THAN BASELINE, DIFFICULT TO AROUSE. PER EMS 
REPORT, PT HYPOTENSIVE AND TACHYCARDIC PTA. TO ER BED 8. PT ATTACHED TO 
MONITOR.

## 2022-08-14 VITALS — SYSTOLIC BLOOD PRESSURE: 97 MMHG | DIASTOLIC BLOOD PRESSURE: 53 MMHG

## 2022-08-14 VITALS — SYSTOLIC BLOOD PRESSURE: 80 MMHG | DIASTOLIC BLOOD PRESSURE: 41 MMHG

## 2022-08-14 VITALS — DIASTOLIC BLOOD PRESSURE: 45 MMHG | SYSTOLIC BLOOD PRESSURE: 79 MMHG

## 2022-08-14 VITALS — DIASTOLIC BLOOD PRESSURE: 54 MMHG | SYSTOLIC BLOOD PRESSURE: 92 MMHG

## 2022-08-14 VITALS — SYSTOLIC BLOOD PRESSURE: 132 MMHG | DIASTOLIC BLOOD PRESSURE: 78 MMHG

## 2022-08-14 VITALS — SYSTOLIC BLOOD PRESSURE: 105 MMHG | DIASTOLIC BLOOD PRESSURE: 55 MMHG

## 2022-08-14 LAB
BASOPHILS # BLD AUTO: 0 K/UL (ref 0–0.2)
BASOPHILS NFR BLD AUTO: 0.1 % (ref 0–2)
BUN SERPL-MCNC: 42 MG/DL (ref 7–18)
CALCIUM SERPL-MCNC: 7.4 MG/DL (ref 8.5–10.1)
CHLORIDE SERPL-SCNC: 108 MMOL/L (ref 98–107)
CO2 SERPL-SCNC: 17 MMOL/L (ref 21–32)
CREAT SERPL-MCNC: 3.5 MG/DL (ref 0.6–1.3)
EOSINOPHIL NFR BLD AUTO: 6.8 % (ref 0–6)
EOSINOPHIL NFR BLD MANUAL: 5 % (ref 0–4)
GLUCOSE SERPL-MCNC: 110 MG/DL (ref 74–106)
HCT VFR BLD AUTO: 35 % (ref 33–45)
HGB BLD-MCNC: 11.2 G/DL (ref 11.5–14.8)
LYMPHOCYTES NFR BLD AUTO: 0.8 K/UL (ref 0.8–4.8)
LYMPHOCYTES NFR BLD AUTO: 3.7 % (ref 20–44)
LYMPHOCYTES NFR BLD MANUAL: 5 % (ref 16–48)
MAGNESIUM SERPL-MCNC: 1.6 MG/DL (ref 1.8–2.4)
MCHC RBC AUTO-ENTMCNC: 32 G/DL (ref 31–36)
MCV RBC AUTO: 94 FL (ref 82–100)
MONOCYTES NFR BLD AUTO: 0.5 K/UL (ref 0.1–1.3)
MONOCYTES NFR BLD AUTO: 2.4 % (ref 2–12)
MONOCYTES NFR BLD MANUAL: 3 % (ref 0–11)
NEUTROPHILS # BLD AUTO: 19.4 K/UL (ref 1.8–8.9)
NEUTROPHILS NFR BLD AUTO: 87 % (ref 43–81)
NEUTS BAND NFR BLD MANUAL: 2 % (ref 0–5)
NEUTS SEG NFR BLD MANUAL: 85 % (ref 42–76)
PHOSPHATE SERPL-MCNC: 1.8 MG/DL (ref 2.5–4.9)
PLATELET # BLD AUTO: 39 K/UL (ref 150–450)
POTASSIUM SERPL-SCNC: 3.8 MMOL/L (ref 3.5–5.1)
RBC # BLD AUTO: 3.7 MIL/UL (ref 4–5.2)
SODIUM SERPL-SCNC: 138 MMOL/L (ref 136–145)
WBC NRBC COR # BLD AUTO: 22.3 K/UL (ref 4.3–11)

## 2022-08-14 PROCEDURE — 05H933Z INSERTION OF INFUSION DEVICE INTO RIGHT BRACHIAL VEIN, PERCUTANEOUS APPROACH: ICD-10-PCS | Performed by: NURSE PRACTITIONER

## 2022-08-14 RX ADMIN — BACLOFEN SCH MG: 10 TABLET ORAL at 09:00

## 2022-08-14 RX ADMIN — OXYCODONE HYDROCHLORIDE AND ACETAMINOPHEN SCH MG: 500 TABLET ORAL at 13:31

## 2022-08-14 RX ADMIN — Medication SCH ML: at 17:22

## 2022-08-14 RX ADMIN — BACLOFEN SCH MG: 10 TABLET ORAL at 13:30

## 2022-08-14 RX ADMIN — FLUDROCORTISONE ACETATE SCH MG: 0.1 TABLET ORAL at 17:11

## 2022-08-14 RX ADMIN — ATORVASTATIN CALCIUM SCH MG: 10 TABLET, FILM COATED ORAL at 21:15

## 2022-08-14 RX ADMIN — HEPARIN SODIUM SCH UNITS: 5000 INJECTION INTRAVENOUS; SUBCUTANEOUS at 09:36

## 2022-08-14 RX ADMIN — BACLOFEN SCH MG: 10 TABLET ORAL at 17:10

## 2022-08-14 RX ADMIN — HYDROCORTISONE SODIUM SUCCINATE SCH MG: 100 INJECTION, POWDER, FOR SOLUTION INTRAMUSCULAR; INTRAVASCULAR at 13:31

## 2022-08-14 RX ADMIN — HYDROCORTISONE SODIUM SUCCINATE SCH MG: 100 INJECTION, POWDER, FOR SOLUTION INTRAMUSCULAR; INTRAVASCULAR at 21:13

## 2022-08-14 RX ADMIN — Medication SCH MG: at 13:30

## 2022-08-14 RX ADMIN — BACLOFEN SCH MG: 10 TABLET ORAL at 21:13

## 2022-08-14 RX ADMIN — ACETAMINOPHEN PRN MG: 650 SUPPOSITORY RECTAL at 00:27

## 2022-08-14 RX ADMIN — AMLODIPINE BESYLATE SCH MG: 10 TABLET ORAL at 09:00

## 2022-08-14 RX ADMIN — GABAPENTIN SCH MG: 100 CAPSULE ORAL at 13:31

## 2022-08-14 RX ADMIN — HEPARIN SODIUM SCH UNITS: 5000 INJECTION INTRAVENOUS; SUBCUTANEOUS at 17:00

## 2022-08-14 RX ADMIN — Medication SCH MG: at 17:10

## 2022-08-14 RX ADMIN — CEFEPIME HYDROCHLORIDE SCH MLS/HR: 1 INJECTION, POWDER, FOR SOLUTION INTRAMUSCULAR; INTRAVENOUS at 11:24

## 2022-08-14 RX ADMIN — DONEPEZIL HYDROCHLORIDE SCH MG: 5 TABLET ORAL at 21:14

## 2022-08-14 RX ADMIN — ASPIRIN 81 MG SCH MG: 81 TABLET ORAL at 13:31

## 2022-08-14 RX ADMIN — ACETAMINOPHEN PRN MG: 650 SUPPOSITORY RECTAL at 07:05

## 2022-08-14 RX ADMIN — Medication SCH ML: at 09:49

## 2022-08-14 RX ADMIN — FLUDROCORTISONE ACETATE SCH MG: 0.1 TABLET ORAL at 13:33

## 2022-08-14 RX ADMIN — FAMOTIDINE SCH MG: 10 INJECTION INTRAVENOUS at 09:35

## 2022-08-14 RX ADMIN — LACOSAMIDE SCH MG: 50 TABLET, FILM COATED ORAL at 13:31

## 2022-08-14 RX ADMIN — SODIUM CHLORIDE SCH MLS/HR: 9 INJECTION, SOLUTION INTRAVENOUS at 09:30

## 2022-08-14 RX ADMIN — LACOSAMIDE SCH MG: 50 TABLET, FILM COATED ORAL at 21:13

## 2022-08-14 RX ADMIN — SODIUM CHLORIDE SCH MLS/HR: 9 INJECTION, SOLUTION INTRAVENOUS at 20:55

## 2022-08-14 RX ADMIN — FLUDROCORTISONE ACETATE SCH MG: 0.1 TABLET ORAL at 00:00

## 2022-08-14 RX ADMIN — FLUDROCORTISONE ACETATE SCH MG: 0.1 TABLET ORAL at 06:00

## 2022-08-14 RX ADMIN — GABAPENTIN SCH MG: 100 CAPSULE ORAL at 17:11

## 2022-08-14 RX ADMIN — HYDROCORTISONE SODIUM SUCCINATE SCH MG: 100 INJECTION, POWDER, FOR SOLUTION INTRAMUSCULAR; INTRAVASCULAR at 11:20

## 2022-08-14 RX ADMIN — GABAPENTIN SCH MG: 100 CAPSULE ORAL at 09:00

## 2022-08-14 RX ADMIN — DEXTROSE AND SODIUM CHLORIDE SCH MLS/HR: 5; 900 INJECTION, SOLUTION INTRAVENOUS at 03:01

## 2022-08-14 NOTE — NUR
TELE RN NOTE



STILL FOR KUB X RAY TO CONFIRM NGT PLACEMENT. FOLLOW UP MADE. RADIOLOGIST SAID THEY WILL 
JUST FINISH ER PATIENT.

## 2022-08-14 NOTE — NUR
TELE RN OPENING NOTE

RECEIVED PATIENT IN BED; A/O X0, NONVERBAL BUT MAKES SOUNDS AT TIMES, LETHARGIC. RESPIRATION 
EVEN AND NONLABORED. ON ROOM AIR; SATURATING @ 96%. WITH NGT IN PLACE ON LEVEL 57. IV ACCESS 
ON LEFT HAND 20G, LEFT WRIST 20G AND LEFT ANTECUBITAL 18G; PATENT AND INTACT. WITH IV 
INFUSION OF D5NS ON LAC REGULATED @ 150 ML/HR; FLUSHES WELL. WITH PRESCOTT CATHETER IN PLACE 
WITH SCANTY URINE OUTPUT. WITH RIGHT UPPER ARM MIDLINE 18G; PATENT AND INTACT. SAFETY 
MEASURES IMPLEMENTED: HEAD OF BED ELEVATED, SIDE RAILS UP X2, BED IN LOWEST LOCKED POSITION. 
WILL CONTINUE PLAN OF CARE

## 2022-08-14 NOTE — NUR
TELE RN NOTE



HOSPITALIST COMMUNICATED ORDER OF DR. CABELLO FOR NEPRHOSTOMY TUBE PLACEMENT. CALLED SISTER 
UGO BACILIO [phone]. SISTER UPDATED ON PATIENT'S CONDITION AND MD ORDER FOR 
NEPHROSTOMY TUBE INSERTION. SHE GAVE HER PERMISSION FOR THE PROCEDURE AND WAS WITNESSED BY 
ANOTHER NURSE ALVERTO. SECURED ATTACHED TO CHART. WILL ENDORSE ACCORDINGLY

## 2022-08-14 NOTE — NUR
TELE RN NOTE



SPOKE WITH PHARMACIST. VANCO TROUGH LEVEL EXTRACTED AT 0600H NOT YET AVAILABLE, PATIENT WITH 
DUE VANCOMYCIN LEVEL. PER PHARMACIST, MAY GIVE VANCO. VANCOMYCIN GIVEN AS ORDERED.

## 2022-08-14 NOTE — NUR
TELE RN NOTE



SEEN BY HOSPITALIST DENISE. NOTIFIED OF SCANTY OUTPUT AND PATIENT NOT ABLE TO SWALLOW. WITH 
ORDER TO INSERT NGT. NGT INSERTED AS ORDERED. STAT KUB ORDERED FOR NGT PLACEMENT. WILL 
CONTINUE TO MONITOR PATIENT. NOT IN DISTRESS.

## 2022-08-14 NOTE — NUR
TELE RN NOTE



LATEST BP 90/45. NOT IN DISTRESS. APPEARS STABLE. RECEIVED A CALL FROM Eons AND 
RELAYED GRAM POSITIVE RODS ON BOTH 1ST SET AND 1 OF 2 ON THE 2ND SET . RESULTS RELAYED TO 
HOSPITALIST DENISE. PATIENT ON IV ANTIBIOTICS. WILL CONTINUE TO MONITOR PATIENT.

## 2022-08-14 NOTE — NUR
TELE RN CLOSING NOTE



PATIENT IN BED; WITH SPONTANEOUS EYE OPENING, NONVERBAL BUT MAKES SOUNDS AT TIMES, 
LETHARGIC. WITH CONTRACTED RIGHT UPPER EXTREMITY AND LEFT WITH MOTOR/ SENSORY DEFICIT. 
UNABLE TO FULLY ASSESS PATIENT. RESPIRATION EVEN AND NONLABORED. ON ROOM AIR; SATURATING @ 
96%. WITH NASOGASTRIC TUBE, IN PLACE AT LEVEL 57. WITH IV ACCESS ON LEFT HAND 20G, LEFT 
WRIST 20G AND LEFT ANTECUBITAL 18G; PATENT AND INTACT. WITH IV INFUSION OF D5NS ON LAC 
RUNNING @ 100 ML/HR; WITH PRESCOTT CATHETER TO URINE BAG, STILL WITH NO OUTPUT, MD AWARE. 
MAINTAINED ON HIGH BACK REST. ON NORMAL BODY ALIGNMENT. REMAINED AFEBRILE THE ENTIRE SHIFT. 
VASCULAR NURSE INSERTING MIDLINE. SAFETY MEASURES IMPLEMENTED WITH CALL LIGHT AND TABLE 
WITHIN REACH, SIDE RAILS UP X2, BED IN LOWEST LOCKED POSITION. WILL ENDORSE PATIENT FOR 
CONTINUITY OF CARE.

## 2022-08-14 NOTE — NUR
TELE RN NOTE



LATEST BP 80/41, NO SIGNS AND SYMPTOMS OF HYPOTENSION/ DISTRESS NOTED AT THIS TIME. PATIENT 
SEEN BY DENISE. . STILL WITH SCANTY URINE OUTPUT. WITH ORDER TO GIVE 500ML BOLUS OF 
NS. BOLUS ONGOING AT THIS TIME. IN STABLE CONDITION.

## 2022-08-14 NOTE — NUR
RN NOTE

TEMP RECHECKED - 100.3. PRN TYLENOL 650 MG SUPPOSITORY GIVEN PER RECTUM; WILL CONTINUE TO 
MONITOR AND REASSESS PT.

## 2022-08-14 NOTE — NUR
TELE RN NOTE



WITH ORDER FOR URINE COLLECTION. MD NOTIFIED AND ASKED IF SHE WANTS TO KEEP PATIENT ON 
INDWELLING CATH. MD ORDERS RECEIVED AND READ BACK AND VERIFIED. PRESCOTT CATHETER INSERTED AND 
TOLERATED WELL. NOTED SCANTY URINE OUTPUT. WILL COLLECT ONCE WITH SUBSTANTIAL AMOUNT. WILL 
CONTINUE TO MONITOR PATIENT.

## 2022-08-14 NOTE — NUR
TELE RN CLOSING NOTE

PATIENT IN BED; A/O X0, NONVERBAL, LETHARGIC AND DIFFICULT TO AROUSE. BREATHING EVEN AND 
UNLABORED. STABLE ON ROOM AIR; SATURATING @ 97%. WITH IV ACCESS ON LEFT HAND 20G, LEFT WRIST 
20G AND LEFT ANTECUBITAL 18G; PATENT AND INTACT. WITH IV INFUSION OF D5NS 1L ON LAC 
REGULATED @ 100 ML/HR; FLUSHES WELL. ALL NEEDS RENDERED. SAFETY MEASURES MAINTAINED: HEAD OF 
BED ELEVATED, CALL LIGHT AND TABLE WITHIN REACH, SIDE RAILS UP X2, BED IN LOWEST LOCKED 
POSITION. ENDORSED TO SHANDA DEL VALLE FOR MIGUEL.

## 2022-08-14 NOTE — NUR
TELE RN NOTE



LATEST URINE OUTPUT AT 50ML. PATIENT ON IVF OF D5NS  ML/HR. PATIENT WITH CLEAR BREATH 
SOUNDS AND NON-DISTENDED JUGULAR VEIN. HOSPITALIST NOTIFIED WITH ORDER TO INCREASE IVF TO 
150ML/HR. ORDERS VERIFIED AND CARRIED OUT. PATIENT NOT IN DISTRESS. BP OF 98/55 WITH HR OF 
145.

## 2022-08-14 NOTE — NUR
RN NOTE

RECEIVED CRITICAL LAB RESULT PLATELET COUNT - 39. DR. CAZARES NOTIFIED AND MADE AWARE. WITH 
ORDER MADE AND CARRIED OUT; FOR REPEAT CBC - PLATELET COUNT IN THE MORNING.

## 2022-08-14 NOTE — NUR
RN NOTE

TEMP RECHECKED - 102.3. PRN TYLENOL 650 MG SUPPOSITORY GIVEN PER RECTUM; WILL CONTINUE TO 
MONITOR AND REASSESS PT.

## 2022-08-14 NOTE — NUR
TELE RN OPENING NOTE



RECEIVED PATIENT IN BED; WITH SPONTANEOUS EYE OPENING WITH BUT STARES BLANKLY, NONVERBAL, 
LETHARGIC AND DIFFICULT TO AROUSE. WITH CONTRACTED RIGHT UPPER EXTREMITY AND LEFT WITH 
MOTOR/ SENSORY DEFICIT. UNABLE TO FULLY ASSESS PATIENT. RESPIRATION EVEN AND NONLABORED. ON 
ROOM AIR; SATURATING @ 95%. WITH IV ACCESS ON LEFT HAND 20G, LEFT WRIST 20G AND LEFT 
ANTECUBITAL 18G; PATENT AND INTACT. WITH IV INFUSION OF D5NS 1L ON LAC RUNNING @ 100 ML/HR; 
FLUSHES WELL. MAINTAINED ON HIGH BACK REST. ON NORMAL BODY ALIGNMENT. SAFETY MEASURES 
IMPLEMENTED WITH CALL LIGHT AND TABLE WITHIN REACH, SIDE RAILS UP X2, BED IN LOWEST LOCKED 
POSITION. WILL CONTINUE TO MONITOR

## 2022-08-15 VITALS — SYSTOLIC BLOOD PRESSURE: 106 MMHG | DIASTOLIC BLOOD PRESSURE: 56 MMHG

## 2022-08-15 VITALS — DIASTOLIC BLOOD PRESSURE: 55 MMHG | SYSTOLIC BLOOD PRESSURE: 100 MMHG

## 2022-08-15 VITALS — DIASTOLIC BLOOD PRESSURE: 62 MMHG | SYSTOLIC BLOOD PRESSURE: 121 MMHG

## 2022-08-15 VITALS — DIASTOLIC BLOOD PRESSURE: 56 MMHG | SYSTOLIC BLOOD PRESSURE: 110 MMHG

## 2022-08-15 VITALS — SYSTOLIC BLOOD PRESSURE: 114 MMHG | DIASTOLIC BLOOD PRESSURE: 64 MMHG

## 2022-08-15 VITALS — DIASTOLIC BLOOD PRESSURE: 57 MMHG | SYSTOLIC BLOOD PRESSURE: 141 MMHG

## 2022-08-15 LAB
BASOPHILS # BLD AUTO: 0 K/UL (ref 0–0.2)
BASOPHILS NFR BLD AUTO: 0 % (ref 0–2)
BUN SERPL-MCNC: 47 MG/DL (ref 7–18)
CALCIUM SERPL-MCNC: 7.7 MG/DL (ref 8.5–10.1)
CHLORIDE SERPL-SCNC: 108 MMOL/L (ref 98–107)
CO2 SERPL-SCNC: 16 MMOL/L (ref 21–32)
CREAT SERPL-MCNC: 3.6 MG/DL (ref 0.6–1.3)
EOSINOPHIL NFR BLD AUTO: 2.9 % (ref 0–6)
GLUCOSE SERPL-MCNC: 62 MG/DL (ref 74–106)
HCT VFR BLD AUTO: 30 % (ref 33–45)
HGB BLD-MCNC: 9.6 G/DL (ref 11.5–14.8)
LYMPHOCYTES NFR BLD AUTO: 0.6 K/UL (ref 0.8–4.8)
LYMPHOCYTES NFR BLD AUTO: 1.8 % (ref 20–44)
MAGNESIUM SERPL-MCNC: 1.7 MG/DL (ref 1.8–2.4)
MCHC RBC AUTO-ENTMCNC: 32 G/DL (ref 31–36)
MCV RBC AUTO: 93 FL (ref 82–100)
MONOCYTES NFR BLD AUTO: 1.1 K/UL (ref 0.1–1.3)
MONOCYTES NFR BLD AUTO: 3.6 % (ref 2–12)
NEUTROPHILS # BLD AUTO: 29.5 K/UL (ref 1.8–8.9)
NEUTROPHILS NFR BLD AUTO: 91.7 % (ref 43–81)
PHOSPHATE SERPL-MCNC: 2.6 MG/DL (ref 2.5–4.9)
PLATELET # BLD AUTO: 16 K/UL (ref 150–450)
POTASSIUM SERPL-SCNC: 4.6 MMOL/L (ref 3.5–5.1)
RBC # BLD AUTO: 3.21 MIL/UL (ref 4–5.2)
SODIUM SERPL-SCNC: 139 MMOL/L (ref 136–145)
WBC NRBC COR # BLD AUTO: 32.2 K/UL (ref 4.3–11)

## 2022-08-15 PROCEDURE — 30233R1 TRANSFUSION OF NONAUTOLOGOUS PLATELETS INTO PERIPHERAL VEIN, PERCUTANEOUS APPROACH: ICD-10-PCS | Performed by: NURSE PRACTITIONER

## 2022-08-15 RX ADMIN — LACOSAMIDE SCH MG: 50 TABLET, FILM COATED ORAL at 21:37

## 2022-08-15 RX ADMIN — FLUDROCORTISONE ACETATE SCH MG: 0.1 TABLET ORAL at 17:02

## 2022-08-15 RX ADMIN — HEPARIN SODIUM SCH UNITS: 5000 INJECTION INTRAVENOUS; SUBCUTANEOUS at 09:00

## 2022-08-15 RX ADMIN — FLUDROCORTISONE ACETATE SCH MG: 0.1 TABLET ORAL at 11:49

## 2022-08-15 RX ADMIN — SODIUM CHLORIDE SCH MLS/HR: 9 INJECTION, SOLUTION INTRAVENOUS at 09:08

## 2022-08-15 RX ADMIN — BACLOFEN SCH MG: 10 TABLET ORAL at 12:24

## 2022-08-15 RX ADMIN — CEFEPIME HYDROCHLORIDE SCH MLS/HR: 1 INJECTION, POWDER, FOR SOLUTION INTRAMUSCULAR; INTRAVENOUS at 11:09

## 2022-08-15 RX ADMIN — LACOSAMIDE SCH MG: 50 TABLET, FILM COATED ORAL at 09:24

## 2022-08-15 RX ADMIN — DONEPEZIL HYDROCHLORIDE SCH MG: 5 TABLET ORAL at 21:37

## 2022-08-15 RX ADMIN — HYDROCORTISONE SODIUM SUCCINATE SCH MG: 100 INJECTION, POWDER, FOR SOLUTION INTRAMUSCULAR; INTRAVASCULAR at 05:11

## 2022-08-15 RX ADMIN — BACLOFEN SCH MG: 10 TABLET ORAL at 09:28

## 2022-08-15 RX ADMIN — LEVETIRACETAM SCH MG: 100 SOLUTION ORAL at 21:38

## 2022-08-15 RX ADMIN — HYDROCORTISONE SODIUM SUCCINATE SCH MG: 100 INJECTION, POWDER, FOR SOLUTION INTRAMUSCULAR; INTRAVASCULAR at 12:24

## 2022-08-15 RX ADMIN — GABAPENTIN SCH MG: 100 CAPSULE ORAL at 16:18

## 2022-08-15 RX ADMIN — Medication SCH MG: at 16:19

## 2022-08-15 RX ADMIN — GABAPENTIN SCH MG: 100 CAPSULE ORAL at 12:25

## 2022-08-15 RX ADMIN — DEXTROSE AND SODIUM CHLORIDE PRN MLS/HR: 5; 900 INJECTION, SOLUTION INTRAVENOUS at 09:11

## 2022-08-15 RX ADMIN — HYDROCORTISONE SODIUM SUCCINATE SCH MG: 100 INJECTION, POWDER, FOR SOLUTION INTRAMUSCULAR; INTRAVASCULAR at 21:37

## 2022-08-15 RX ADMIN — BACLOFEN SCH MG: 10 TABLET ORAL at 16:19

## 2022-08-15 RX ADMIN — Medication SCH DROP: at 09:16

## 2022-08-15 RX ADMIN — FLUDROCORTISONE ACETATE SCH MG: 0.1 TABLET ORAL at 00:50

## 2022-08-15 RX ADMIN — Medication SCH MG: at 09:28

## 2022-08-15 RX ADMIN — FLUDROCORTISONE ACETATE SCH MG: 0.1 TABLET ORAL at 05:25

## 2022-08-15 RX ADMIN — DEXTROSE AND SODIUM CHLORIDE PRN MLS/HR: 5; 900 INJECTION, SOLUTION INTRAVENOUS at 18:32

## 2022-08-15 RX ADMIN — OXYCODONE HYDROCHLORIDE AND ACETAMINOPHEN SCH MG: 500 TABLET ORAL at 09:24

## 2022-08-15 RX ADMIN — FLUDROCORTISONE ACETATE SCH MG: 0.1 TABLET ORAL at 23:57

## 2022-08-15 RX ADMIN — Medication SCH DROP: at 16:21

## 2022-08-15 RX ADMIN — BACLOFEN SCH MG: 10 TABLET ORAL at 21:37

## 2022-08-15 RX ADMIN — ATORVASTATIN CALCIUM SCH MG: 10 TABLET, FILM COATED ORAL at 21:37

## 2022-08-15 RX ADMIN — GABAPENTIN SCH MG: 100 CAPSULE ORAL at 09:27

## 2022-08-15 NOTE — NUR
TELE RN CLOSING NOTE 308-1



PATIENT IN BED; A/O X0, NONVERBAL, LETHARGIC,  DIFFICULT TO AROUSE. BREATHING EVEN AND NOT 
LABORED. TELE MONITORING SHOWS SINUS TACHY  HR. STABLE ON ROOM AIR WITH OXYGEN 
SATURATION OF 98%. NGT WITH RUNNING JEVITY 1.2 @30 ML/HR. IV ACCESS ON LEFT HAND 20G  AND 
[place] MIDLINE G#18 RUNNING D5NS @150ML/HR. BOTH LINES ARE PATENT AND INTACT. FLUSHES WELL. 
WITH PRESCOTT CATHETER IN PLACE WITH NO OUTPUT, BLADDER NOT DISTENDED, BLADDER SCAN DONE. 
SAFETY MEASURES IMPLEMENTED: HEAD OF BED ELEVATED, SIDE RAILS UP X2, BED IN LOWEST LOCKED 
POSITION. FOR PLATELET TRANSFUSION TONIGHT, ENDORSED TO THE NEXT SHIFT.

## 2022-08-15 NOTE — NUR
TELE RN CLOSING NOTE

PATIENT IN BED; A/O X0, NONVERBAL BUT MAKES SOUNDS AT TIMES, LETHARGIC. BREATHING EVEN AND 
NONLABORED. STABLE ON ROOM AIR. WITH NGT IN PLACE ON LEVEL 57. IV ACCESS ON LEFT HAND 20G, 
LEFT WRIST 20G AND LEFT ANTECUBITAL 18G; PATENT AND INTACT. WITH IV INFUSION OF D5NS ON LAC 
REGULATED @ 150 ML/HR; FLUSHES WELL. WITH PRESCOTT CATHETER IN PLACE WITH SCANTY URINE OUTPUT. 
WITH RIGHT UPPER ARM MIDLINE 18G; PATENT AND INTACT. SAFETY MEASURES IMPLEMENTED: HEAD OF 
BED ELEVATED, SIDE RAILS UP X2, BED IN LOWEST LOCKED POSITION. ENDORSED TO MORNING SHIFT FOR 
MIGUEL.

## 2022-08-15 NOTE — NUR
RN NOTES



CALLED BLOOD BANK TO FOLLOW UP ON THE PLATELET BAG, SPOKE TO YARITZA, MENTIONED IT'S NOT YET 
READY.

## 2022-08-15 NOTE — NUR
TELE RN OPENING NOTE



RECEIVED PATIENT IN BED SLEEPING BUT EASY TO AROUSED.AOXO WILFREDO WELL ON ROOM AIR NO SIGN 
SOB/DISTRESS NOTED.JEVITY 30CC/HR VIA NGT ON R NARES INTACT.WILFREDO WELL.NO RESIDUAL NOTED.HOB 
ELEVATED AT ALL TIMES. IV ACCESSES ON LEFT HAND G#20 SALINE LOCKED D5NS RUNNING  ML/HR 
AND LEFT ANTECUBITAL G#18,FLORENTINO MIDLINE PATENT, INTACT, FLUSHES WELL.F/C INTACT NO OUTPUT IN 
THE BAG AT THIS TIME.TELEMONITORING SHOW SINUS TACHY AT 118HR,SAFETY MEASURES IMPLEMENTED: 
SIDE RAILS UP X2, BED IN LOWEST LOCKED POSITION. WILL CONTINUE TO MONITOR..

## 2022-08-15 NOTE — NUR
RN NOTES



NGT NOTED TO NOT HAVE ANY RESIDUE, PATIENT IS ABLE TO TOLERATED. INCREASED FEEDING TO 30 
ML/HR FROM 20 ML/HR AS ORDERED. ENDORSED TO THE NEXT NURSE.

## 2022-08-15 NOTE — NUR
RN NOTES



MAGNESIUM IS NOTED TO BE AT 1.7, NOTIFIED NP DENISE VICKERS. ORDERED TO ADMINISTER 1 GRAM/100 ML 
IN D52 X 1 BAG ONLY. ORDER CREATED.

## 2022-08-15 NOTE — NUR
TELE RN OPENING NOTE



RECEIVED PATIENT IN BED SLEEPING; A/OX0, NONVERBAL MOANING, LETHARGIC, DIFFICULT TO AROUSE. 
PATIENT NOT HAVING ANY SIGNS AND SYMPTOMS OF RESPIRATORY DISTRESS ON ROOM AIR; SATURATING @ 
97%. WITH NGT IN PLACE NOTED ON R NARES. IV ACCESSES ON LEFT HAND G#20 SALINE LOCKED D5NS 
RUNNING  ML/HR AND LEFT ANTECUBITAL G#18,FLORENTINO MIDLINE PATENT, INTACT, FLUSHES WELL. 
WITH PRESCOTT CATHETER IN PLACE NO OUTPUT NOTED AT THIS TIME. PATIENT HAS R ARM CONTRACTURE AND 
R ABOVE KNEE AMPUTATION. TELEMONITORING SHOW SINUS TACHY  HR, MD IS AWARE. SAFETY 
MEASURES IMPLEMENTED: HEAD OF BED ELEVATED, SIDE RAILS UP X2, BED IN LOWEST LOCKED POSITION. 
WILL CONTINUE PLAN OF CARE.

## 2022-08-15 NOTE — NUR
RN NOTES



RECEIVED A CALL FROM  [facility] REPORTING CRITICAL RESULT FOR PATIENT'S WBC AT 32.2 AND 
PLATELET AT 16, INFORMED NP DENISE VICKERS AND WAS ACKNOWLEDGED. NO NEW ORDERS AT THIS TIME.

## 2022-08-15 NOTE — NUR
WOUND CARE CONSULT: PT  PRESENTS WITH RT ABOVE KNEE AMPUTATION STUMP WITH SCARRING, LOWER 
BACK DISCOLORATION AND PINK DISCOLORATION TO LEFT BREASTFOLD. RECOMMENDATIONS MADE FOR SKIN 
PROTECTION. DISCUSSED WITH NURSING STAFF. PT IS ON JOSH ISOFLEX LOW AIRLOSS BED. MD IN 
AGREEMENT WITH PLAN OF CARE.

## 2022-08-15 NOTE — NUR
RN NOTES



CALLED BLOOD BANK TO FOLLOW UP ON THE PLATELET AS ADVISED BY CHARGE NURSE YARITZA, ADVISED 
THAT WONT BE READY UNTIL [place] BUT ADVISED TO CHANGE ORDER TO STAT TO BE DONE ASAP. CHARGE 
NURSE AWARE.

## 2022-08-15 NOTE — NUR
RN NOTES



STILL NO URINE OUTPUT NOTED DURING THIS TIME, BLADDER SCAN DONE AND NOTED 70 ML URINE ONLY.

## 2022-08-15 NOTE — NUR
RN NOTES



TELEPHONE CONSENTS RECEIVED FROM  BEATA ANTOINE [phone] FOR BLOOD 
TRANSFUSION, PLATELETS. WITNESSED BY SHANDA QUEZADA.

## 2022-08-15 NOTE — NUR
RN NOTES



RECEIVED A CALL FROM  [facility] REPORTING CRITICAL RESULT FOR PATIENT'S CORTISOL , 
INFORMED NP DENISE VICKERS AND WAS ACKNOWLEDGED. NO NEW ORDERS AT THIS TIME.

## 2022-08-15 NOTE — NUR
RN NOTES



CALLED RADIOLOGY TO FOLLOW UP ON NEPHROSTOMY PLACEMENT TODAY AND WAS TOLD BY SHY SAHNI 
AND MARIUM THAT IT WILL BE TOMORROW. STARTED JEVITY 1.2 @20 ML/HR AND WILL CHECK AFTER 4 HOURS 
AND ADJUST ACCORDINGLY IF PATIENT IS TOLERATING WELL, WILL ADVANCE 10-15 ML/HER TO REACH 
GOAL OF 55 ML/HR

## 2022-08-16 VITALS — DIASTOLIC BLOOD PRESSURE: 68 MMHG | SYSTOLIC BLOOD PRESSURE: 128 MMHG

## 2022-08-16 VITALS — SYSTOLIC BLOOD PRESSURE: 100 MMHG | DIASTOLIC BLOOD PRESSURE: 60 MMHG

## 2022-08-16 VITALS — SYSTOLIC BLOOD PRESSURE: 95 MMHG | DIASTOLIC BLOOD PRESSURE: 58 MMHG

## 2022-08-16 VITALS — SYSTOLIC BLOOD PRESSURE: 89 MMHG | DIASTOLIC BLOOD PRESSURE: 62 MMHG

## 2022-08-16 VITALS — DIASTOLIC BLOOD PRESSURE: 60 MMHG | SYSTOLIC BLOOD PRESSURE: 98 MMHG

## 2022-08-16 VITALS — SYSTOLIC BLOOD PRESSURE: 91 MMHG | DIASTOLIC BLOOD PRESSURE: 56 MMHG

## 2022-08-16 VITALS — SYSTOLIC BLOOD PRESSURE: 100 MMHG | DIASTOLIC BLOOD PRESSURE: 51 MMHG

## 2022-08-16 VITALS — DIASTOLIC BLOOD PRESSURE: 57 MMHG | SYSTOLIC BLOOD PRESSURE: 97 MMHG

## 2022-08-16 VITALS — SYSTOLIC BLOOD PRESSURE: 94 MMHG | DIASTOLIC BLOOD PRESSURE: 57 MMHG

## 2022-08-16 VITALS — DIASTOLIC BLOOD PRESSURE: 35 MMHG | SYSTOLIC BLOOD PRESSURE: 61 MMHG

## 2022-08-16 VITALS — DIASTOLIC BLOOD PRESSURE: 53 MMHG | SYSTOLIC BLOOD PRESSURE: 91 MMHG

## 2022-08-16 VITALS — DIASTOLIC BLOOD PRESSURE: 49 MMHG | SYSTOLIC BLOOD PRESSURE: 96 MMHG

## 2022-08-16 VITALS — SYSTOLIC BLOOD PRESSURE: 96 MMHG | DIASTOLIC BLOOD PRESSURE: 58 MMHG

## 2022-08-16 VITALS — SYSTOLIC BLOOD PRESSURE: 98 MMHG | DIASTOLIC BLOOD PRESSURE: 58 MMHG

## 2022-08-16 VITALS — DIASTOLIC BLOOD PRESSURE: 55 MMHG | SYSTOLIC BLOOD PRESSURE: 97 MMHG

## 2022-08-16 VITALS — DIASTOLIC BLOOD PRESSURE: 62 MMHG | SYSTOLIC BLOOD PRESSURE: 96 MMHG

## 2022-08-16 VITALS — SYSTOLIC BLOOD PRESSURE: 103 MMHG | DIASTOLIC BLOOD PRESSURE: 51 MMHG

## 2022-08-16 VITALS — SYSTOLIC BLOOD PRESSURE: 99 MMHG | DIASTOLIC BLOOD PRESSURE: 54 MMHG

## 2022-08-16 VITALS — DIASTOLIC BLOOD PRESSURE: 50 MMHG | SYSTOLIC BLOOD PRESSURE: 96 MMHG

## 2022-08-16 LAB
BASE EXCESS BLDA CALC-SCNC: -11.5 MMOL/L
BASE EXCESS BLDA CALC-SCNC: -13 MMOL/L
BASOPHILS # BLD AUTO: 0.1 K/UL (ref 0–0.2)
BASOPHILS NFR BLD AUTO: 0.2 % (ref 0–2)
BILIRUB UR QL STRIP: NEGATIVE
COLOR UR: (no result)
CREAT UR-MCNC: 150.9 MG/DL (ref 30–125)
DO-HGB MFR BLDA: 406.4 MMHG
DO-HGB MFR BLDA: 597.2 MMHG
EOSINOPHIL NFR BLD AUTO: 0.3 % (ref 0–6)
EOSINOPHIL NFR BLD MANUAL: 2 % (ref 0–4)
GLUCOSE UR STRIP-MCNC: NEGATIVE MG/DL
HCT VFR BLD AUTO: 27 % (ref 33–45)
HGB BLD-MCNC: 8.8 G/DL (ref 11.5–14.8)
INHALED O2 CONCENTRATION: 100 %
INHALED O2 CONCENTRATION: 100 %
INHALED O2 FLOW RATE: 15 L/MIN (ref 0–30)
LEUKOCYTE ESTERASE UR QL STRIP: (no result)
LYMPHOCYTES NFR BLD AUTO: 0.4 K/UL (ref 0.8–4.8)
LYMPHOCYTES NFR BLD AUTO: 1.3 % (ref 20–44)
LYMPHOCYTES NFR BLD MANUAL: 3 % (ref 16–48)
LYMPHOCYTES NFR BLD MANUAL: 4 % (ref 16–48)
MCHC RBC AUTO-ENTMCNC: 32 G/DL (ref 31–36)
MCV RBC AUTO: 93 FL (ref 82–100)
METAMYELOCYTES NFR BLD MANUAL: 3 % (ref 0–0)
MONOCYTES NFR BLD AUTO: 2 K/UL (ref 0.1–1.3)
MONOCYTES NFR BLD AUTO: 5.6 % (ref 2–12)
MONOCYTES NFR BLD MANUAL: 2 % (ref 0–11)
MONOCYTES NFR BLD MANUAL: 3 % (ref 0–11)
MYELOCYTES NFR BLD MANUAL: 2 % (ref 0–0)
NEUTROPHILS # BLD AUTO: 32.6 K/UL (ref 1.8–8.9)
NEUTROPHILS NFR BLD AUTO: 92.6 % (ref 43–81)
NEUTS BAND NFR BLD MANUAL: 4 % (ref 0–5)
NEUTS BAND NFR BLD MANUAL: 8 % (ref 0–5)
NEUTS SEG NFR BLD MANUAL: 81 % (ref 42–76)
NEUTS SEG NFR BLD MANUAL: 88 % (ref 42–76)
NITRITE UR QL STRIP: POSITIVE
PCO2 TEMP ADJ BLDA: 35.2 MMHG (ref 35–45)
PCO2 TEMP ADJ BLDA: 47.1 MMHG (ref 35–45)
PH TEMP ADJ BLDA: 7.13 [PH] (ref 7.35–7.45)
PH TEMP ADJ BLDA: 7.24 [PH] (ref 7.35–7.45)
PH UR STRIP: 7 [PH] (ref 5–8)
PLATELET # BLD AUTO: 32 K/UL (ref 150–450)
PO2 TEMP ADJ BLDA: 271.4 MMHG (ref 75–100)
PO2 TEMP ADJ BLDA: 68.7 MMHG (ref 75–100)
PROT UR QL STRIP: >=300 MG/DL
RBC # BLD AUTO: 2.95 MIL/UL (ref 4–5.2)
RBC #/AREA URNS HPF: (no result) /HPF (ref 0–2)
SAO2 % BLDA: 92.8 % (ref 92–98.5)
SAO2 % BLDA: 99.3 % (ref 92–98.5)
SODIUM UR-SCNC: 19 MMOL/L (ref 40–220)
UROBILINOGEN UR STRIP-MCNC: 0.2 EU/DL
WBC #/AREA URNS HPF: (no result) /HPF (ref 0–3)
WBC NRBC COR # BLD AUTO: 35.2 K/UL (ref 4.3–11)

## 2022-08-16 PROCEDURE — 5A1945Z RESPIRATORY VENTILATION, 24-96 CONSECUTIVE HOURS: ICD-10-PCS | Performed by: INTERNAL MEDICINE

## 2022-08-16 PROCEDURE — 0BH18EZ INSERTION OF ENDOTRACHEAL AIRWAY INTO TRACHEA, VIA NATURAL OR ARTIFICIAL OPENING ENDOSCOPIC: ICD-10-PCS

## 2022-08-16 RX ADMIN — FLUDROCORTISONE ACETATE SCH MG: 0.1 TABLET ORAL at 17:06

## 2022-08-16 RX ADMIN — BACLOFEN SCH MG: 10 TABLET ORAL at 16:35

## 2022-08-16 RX ADMIN — LEVETIRACETAM SCH MG: 100 SOLUTION ORAL at 21:03

## 2022-08-16 RX ADMIN — GABAPENTIN SCH MG: 100 CAPSULE ORAL at 12:19

## 2022-08-16 RX ADMIN — Medication SCH MG: at 16:35

## 2022-08-16 RX ADMIN — DEXTROSE AND SODIUM CHLORIDE PRN MLS/HR: 5; 900 INJECTION, SOLUTION INTRAVENOUS at 19:21

## 2022-08-16 RX ADMIN — OXYCODONE HYDROCHLORIDE AND ACETAMINOPHEN SCH MG: 500 TABLET ORAL at 08:57

## 2022-08-16 RX ADMIN — MEROPENEM SCH MLS/HR: 500 INJECTION INTRAVENOUS at 23:11

## 2022-08-16 RX ADMIN — ATORVASTATIN CALCIUM SCH MG: 10 TABLET, FILM COATED ORAL at 21:03

## 2022-08-16 RX ADMIN — HYDROCORTISONE SODIUM SUCCINATE SCH MG: 100 INJECTION, POWDER, FOR SOLUTION INTRAMUSCULAR; INTRAVASCULAR at 04:11

## 2022-08-16 RX ADMIN — FLUDROCORTISONE ACETATE SCH MG: 0.1 TABLET ORAL at 05:36

## 2022-08-16 RX ADMIN — GABAPENTIN SCH MG: 100 CAPSULE ORAL at 08:57

## 2022-08-16 RX ADMIN — BACLOFEN SCH MG: 10 TABLET ORAL at 08:56

## 2022-08-16 RX ADMIN — DEXTROSE AND SODIUM CHLORIDE PRN MLS/HR: 5; 900 INJECTION, SOLUTION INTRAVENOUS at 16:44

## 2022-08-16 RX ADMIN — DONEPEZIL HYDROCHLORIDE SCH MG: 5 TABLET ORAL at 21:03

## 2022-08-16 RX ADMIN — LEVETIRACETAM SCH MG: 100 SOLUTION ORAL at 08:56

## 2022-08-16 RX ADMIN — LACOSAMIDE SCH MG: 50 TABLET, FILM COATED ORAL at 08:57

## 2022-08-16 RX ADMIN — GABAPENTIN SCH MG: 100 CAPSULE ORAL at 16:35

## 2022-08-16 RX ADMIN — SODIUM CHLORIDE PRN MLS/HR: 9 INJECTION, SOLUTION INTRAVENOUS at 23:04

## 2022-08-16 RX ADMIN — HYDROCORTISONE SODIUM SUCCINATE SCH MG: 100 INJECTION, POWDER, FOR SOLUTION INTRAMUSCULAR; INTRAVASCULAR at 12:22

## 2022-08-16 RX ADMIN — BACLOFEN SCH MG: 10 TABLET ORAL at 12:19

## 2022-08-16 RX ADMIN — Medication SCH DROP: at 08:56

## 2022-08-16 RX ADMIN — Medication SCH MG: at 08:57

## 2022-08-16 RX ADMIN — FLUDROCORTISONE ACETATE SCH MG: 0.1 TABLET ORAL at 11:34

## 2022-08-16 RX ADMIN — LACOSAMIDE SCH MG: 50 TABLET, FILM COATED ORAL at 21:03

## 2022-08-16 RX ADMIN — Medication SCH DROP: at 16:54

## 2022-08-16 RX ADMIN — FLUDROCORTISONE ACETATE SCH MG: 0.1 TABLET ORAL at 23:11

## 2022-08-16 RX ADMIN — PROPOFOL PRN MLS/HR: 10 INJECTION, EMULSION INTRAVENOUS at 18:58

## 2022-08-16 RX ADMIN — FAMOTIDINE SCH MG: 10 INJECTION INTRAVENOUS at 08:55

## 2022-08-16 RX ADMIN — MEROPENEM SCH MLS/HR: 500 INJECTION INTRAVENOUS at 11:34

## 2022-08-16 RX ADMIN — BACLOFEN SCH MG: 10 TABLET ORAL at 21:03

## 2022-08-16 RX ADMIN — DEXTROSE AND SODIUM CHLORIDE PRN MLS/HR: 5; 900 INJECTION, SOLUTION INTRAVENOUS at 05:44

## 2022-08-16 NOTE — NUR
RN NOTES



CALLED BLOOD BANK TO CHECK THE STATUS OF THE PLATELET BAGS ORDERED, PLACED ON HOLD FOR A 
LONG TIME, NO ANSWER. REPORTED TO CHARGE YARITZA.

## 2022-08-16 NOTE — NUR
RT

RAPID RESPONSE CALLED, PT UNRESPONSIVE  PLACE ON NRB ABG DRAWN TRANSFERRED TO ICU, 
INTUBATEDWITH DR MEDELLIN, DIFFICULT AIRWAY USED A BOUGIE TO PLACE A 7.0 23 AT LIP COLOR 
CHANGE, BILATERAL CHEST RISE [facility] 500 100% 5 WILL 
CONT TO MONITORM ABG DONE AFTER 1HR

## 2022-08-16 NOTE — NUR
RN Note



received patient in bed, sedated. Breathing even and unlabored. intubated. ETT 7.0, 24 cm at 
the lip. o2 saturaiton of 100 percent via bedside monitor with good waveform. On tele 
monitoring. Sinus tachycardia at 108 BPM. Skin is warm and dry to touch. noted with right 
nare ngt 65 cm in length, positive placement. Right upper arm midline infusing propofol at 5 
mcg and levo at 0.5 mcg. Left hand 20g pic. Indwelling avery catheter intact, draining by 
gravity. no bleeding at this time. patient turned and repositioned. bed low, in locked 
position. will continue to monitor.

## 2022-08-16 NOTE — NUR
RN NOTES



CHARGE NURSE YARITZA MENTIONED SHE CALLED THE BLOOD BANK TO GET AN UPDATE AND WAS TOLD THAT 
THEY WILL CALL US ONCE BAGS ARE HERE.

## 2022-08-16 NOTE — NUR
RN NOTES



DR GOLDSTEIN MADE AWARE OF PT'S CONDITION WITH ORDER TO TRANSFER PT TO ICU. PT TRANSFER TO 
ICU  VIA ACLS PROTOCOL AT 1810. BEDSIDE REPORT GIVEN TO ICU RN DOROTEO.

## 2022-08-16 NOTE — NUR
RN NOTES



RECEIVED CALL FROM LAB THAT URINE SPECIMEN FOR U/A AND CULTURE IS NOTE ENOUGH. PT IS NOT 
PRODUCING ENOUGH URINE FROM PRESCOTT AT THIS TIME. WILL TRY TO COLLECT AGAIN.

## 2022-08-16 NOTE — NUR
TELE RN OPENING NOTE



RECEIVED PATIENT IN BED SLEEPING; A/OX0, NONVERBAL, DIFFICULT TO AROUSE. PATIENT IS ON 2LPM 
OXYGEN SATURATING AT 97%, TOLERATING WELL, NOT HAVING ANY SIGNS AND SYMPTOMS OF RESPIRATORY 
DISTRESS. WITH NGT IN PLACE NOTED ON R NARES, NPO SINCE MIDNIGHT PER LAST RN. IIV ACCESS ON 
LEFT HAND 20G  AND FLORENTINO MIDLINE G#18 RUNNING D5NS @150ML/HR. BOTH LINES ARE PATENT AND 
INTACT. FLUSHES WELL.  WITH PRESCOTT CATHETER IN PLACE DRAINING TEA COLORED URINE. PATIENT HAS 
R ARM CONTRACTURE AND R ABOVE KNEE AMPUTATION. TELEMONITORING SHOW SINUS RHYTHM AT 81 BPM. 
SAFETY MEASURES IMPLEMENTED: HEAD OF BED ELEVATED, SIDE RAILS UP X2, BED IN LOWEST LOCKED 
POSITION. WILL CONTINUE PLAN OF CARE.

## 2022-08-16 NOTE — NUR
ICU RN NOTE 



RECEIVED PATIENT AT 18:20 FROM  MED SURG NON RESPONSIVE,ORDER FOR INTUBATION,DR MEDELLIN 
INTUBATED THE PATIENT ON VENT SETTING ETT 7/23 AC 16  PEEP 5.IV SITE IS ON RIGHT UPPER 
ARM MIDLINE,INTACT PATENT,PRESCOTT CATH IN PLACE,CALLED SISTER KAVITA LEFT MESSAGE FOR PATIENT 
CONDITION,STARTED LEVOPHED ON 0.1MCG/KG/MIN WILL ENDORSE NEXT COMING SHIFT FOR CONTINUATION 
OF CARE.

## 2022-08-16 NOTE — NUR
RN NOTES



CALLED BLOOD BANK AT 1421 AND SPOKE TO ANEUDY AND WAS TOLD THAT THE BLOOD PRODUCTS WERE 
ORDERED AT AROUND 1030 AND SHOULD ONLY TAKE AROUND 4 HOURS. INFORMED CHARGE NURSE YARITZA.

## 2022-08-16 NOTE — NUR
TELE RN OPENING NOTE;



PATIENT IN BED SLEEPING BUT EASY TO AROUSED.AOXO WILFREDO WELL 2L O2 VIA NC SATTING 95%.NO SIGN 
SOB/DISTRESS NOTED.DUE MEDS GIVEN VIA NGT AS ORDERED.ALL NEEDS ATTENDED.IV ACCESSES ON LEFT 
HAND G#20 SALINE LOCKED D5NS RUNNING  ML/HR AND LEFT ANTECUBITAL G#18,FLORENTINO MIDLINE 
PATENT, INTACT, FLUSHES WELL.F/C INTACT DRAINING JUJU URINE NOTED OUTPUT 
7OOCC.TELEMONITORING SHOW SINUS TACHY AT 102HR,SAFETY MEASURES IMPLEMENTED: SIDE RAILS UP 
X2, BED IN LOWEST LOCKED POSITION. WILL ENDORSED TO NEXT SHIFT.

## 2022-08-16 NOTE — NUR
RN NOTE



Followed up with blood bank regarding the ordered 2 units of platelets, spoke with 
representative, gabe. Per gabe, platelets are not available yet and he will call when it 
is available and ready for .

## 2022-08-16 NOTE — NUR
RN NOTES



DURING ROUTINE ROUND AT ABOUT 1800, PT NOTED PALE LOOKING AND LOOKS DIFFERENT FROM HER 
BASELINE. 02 SAT DROP BELOW 80% ON 2LPM VIA N/C, RAPID RESPONSE WAS CALLED, BLOOD SUGAR 
112MG/DL, INITIAL BP WAS 61/35MM/HG AND PULSE 101 THEN RAPID RESPONSE TEAM ARRIVED TO UNIT.

## 2022-08-16 NOTE — NUR
RN NOTES



ATTEMPTED TO CALL  AND SISTER AT 1725 TO REPORT THAT PATIENT HAS BEEN TRANSFERRED TO 
ICU FOR DECREASED BP. NO ANSWER. LEFT MESSAGE SEPARATELY. 

-------------------------------------------------------------------------------

Addendum: 08/16/22 at 1852 by MARIEL POWELL RN

-------------------------------------------------------------------------------

CORRECTION: WRONG TIME, SHOULD [id number] NOT [id number]

## 2022-08-16 NOTE — NUR
RN NOTES



CALLED BLOOD BANK TO GET AN UPDATE ON 2 UNITS OF PLATELETS SPOKE TO ANEUDY AND MENTIONED HE 
WILL CALL WHEN READY. TOLD HIM THAT 50K PLATELET IS REQUIRED BEFORE NEPHROSTOMY.

## 2022-08-16 NOTE — NUR
RN NOTES



RECEIVED A CALL FROM IMAGING THAT THEY WONT BE ABLE TO DO NEPHROSTOMY BECAUSE PATIENT'S PLT 
IS 32, NEED IT TO BE AT LEAST 50. CONTACTED DR GOLDSTEIN, ORDERED 2 UNITS OF PLATELETS AND 
CHECK CBC AFTER AN HOUR POST TRANSFUSION. ORDERED STAT.

## 2022-08-17 VITALS — SYSTOLIC BLOOD PRESSURE: 113 MMHG | DIASTOLIC BLOOD PRESSURE: 63 MMHG

## 2022-08-17 VITALS — DIASTOLIC BLOOD PRESSURE: 54 MMHG | SYSTOLIC BLOOD PRESSURE: 98 MMHG

## 2022-08-17 VITALS — SYSTOLIC BLOOD PRESSURE: 136 MMHG | DIASTOLIC BLOOD PRESSURE: 74 MMHG

## 2022-08-17 VITALS — SYSTOLIC BLOOD PRESSURE: 135 MMHG | DIASTOLIC BLOOD PRESSURE: 78 MMHG

## 2022-08-17 VITALS — DIASTOLIC BLOOD PRESSURE: 53 MMHG | SYSTOLIC BLOOD PRESSURE: 94 MMHG

## 2022-08-17 VITALS — SYSTOLIC BLOOD PRESSURE: 101 MMHG | DIASTOLIC BLOOD PRESSURE: 65 MMHG

## 2022-08-17 VITALS — SYSTOLIC BLOOD PRESSURE: 100 MMHG | DIASTOLIC BLOOD PRESSURE: 51 MMHG

## 2022-08-17 VITALS — DIASTOLIC BLOOD PRESSURE: 53 MMHG | SYSTOLIC BLOOD PRESSURE: 106 MMHG

## 2022-08-17 VITALS — DIASTOLIC BLOOD PRESSURE: 79 MMHG | SYSTOLIC BLOOD PRESSURE: 139 MMHG

## 2022-08-17 VITALS — SYSTOLIC BLOOD PRESSURE: 97 MMHG | DIASTOLIC BLOOD PRESSURE: 54 MMHG

## 2022-08-17 VITALS — DIASTOLIC BLOOD PRESSURE: 67 MMHG | SYSTOLIC BLOOD PRESSURE: 128 MMHG

## 2022-08-17 VITALS — DIASTOLIC BLOOD PRESSURE: 78 MMHG | SYSTOLIC BLOOD PRESSURE: 138 MMHG

## 2022-08-17 VITALS — SYSTOLIC BLOOD PRESSURE: 96 MMHG | DIASTOLIC BLOOD PRESSURE: 55 MMHG

## 2022-08-17 VITALS — DIASTOLIC BLOOD PRESSURE: 52 MMHG | SYSTOLIC BLOOD PRESSURE: 98 MMHG

## 2022-08-17 VITALS — SYSTOLIC BLOOD PRESSURE: 125 MMHG | DIASTOLIC BLOOD PRESSURE: 65 MMHG

## 2022-08-17 VITALS — SYSTOLIC BLOOD PRESSURE: 111 MMHG | DIASTOLIC BLOOD PRESSURE: 64 MMHG

## 2022-08-17 VITALS — SYSTOLIC BLOOD PRESSURE: 109 MMHG | DIASTOLIC BLOOD PRESSURE: 68 MMHG

## 2022-08-17 VITALS — DIASTOLIC BLOOD PRESSURE: 77 MMHG | SYSTOLIC BLOOD PRESSURE: 129 MMHG

## 2022-08-17 VITALS — DIASTOLIC BLOOD PRESSURE: 58 MMHG | SYSTOLIC BLOOD PRESSURE: 104 MMHG

## 2022-08-17 VITALS — DIASTOLIC BLOOD PRESSURE: 54 MMHG | SYSTOLIC BLOOD PRESSURE: 97 MMHG

## 2022-08-17 VITALS — SYSTOLIC BLOOD PRESSURE: 100 MMHG | DIASTOLIC BLOOD PRESSURE: 52 MMHG

## 2022-08-17 VITALS — SYSTOLIC BLOOD PRESSURE: 116 MMHG | DIASTOLIC BLOOD PRESSURE: 55 MMHG

## 2022-08-17 VITALS — SYSTOLIC BLOOD PRESSURE: 94 MMHG | DIASTOLIC BLOOD PRESSURE: 53 MMHG

## 2022-08-17 VITALS — SYSTOLIC BLOOD PRESSURE: 100 MMHG | DIASTOLIC BLOOD PRESSURE: 58 MMHG

## 2022-08-17 VITALS — SYSTOLIC BLOOD PRESSURE: 113 MMHG | DIASTOLIC BLOOD PRESSURE: 65 MMHG

## 2022-08-17 VITALS — DIASTOLIC BLOOD PRESSURE: 74 MMHG | SYSTOLIC BLOOD PRESSURE: 139 MMHG

## 2022-08-17 VITALS — SYSTOLIC BLOOD PRESSURE: 147 MMHG | DIASTOLIC BLOOD PRESSURE: 75 MMHG

## 2022-08-17 VITALS — DIASTOLIC BLOOD PRESSURE: 51 MMHG | SYSTOLIC BLOOD PRESSURE: 93 MMHG

## 2022-08-17 VITALS — DIASTOLIC BLOOD PRESSURE: 57 MMHG | SYSTOLIC BLOOD PRESSURE: 124 MMHG

## 2022-08-17 VITALS — DIASTOLIC BLOOD PRESSURE: 65 MMHG | SYSTOLIC BLOOD PRESSURE: 122 MMHG

## 2022-08-17 VITALS — SYSTOLIC BLOOD PRESSURE: 101 MMHG | DIASTOLIC BLOOD PRESSURE: 62 MMHG

## 2022-08-17 VITALS — SYSTOLIC BLOOD PRESSURE: 94 MMHG | DIASTOLIC BLOOD PRESSURE: 51 MMHG

## 2022-08-17 VITALS — DIASTOLIC BLOOD PRESSURE: 51 MMHG | SYSTOLIC BLOOD PRESSURE: 100 MMHG

## 2022-08-17 VITALS — SYSTOLIC BLOOD PRESSURE: 102 MMHG | DIASTOLIC BLOOD PRESSURE: 59 MMHG

## 2022-08-17 VITALS — SYSTOLIC BLOOD PRESSURE: 93 MMHG | DIASTOLIC BLOOD PRESSURE: 59 MMHG

## 2022-08-17 VITALS — DIASTOLIC BLOOD PRESSURE: 70 MMHG | SYSTOLIC BLOOD PRESSURE: 134 MMHG

## 2022-08-17 VITALS — SYSTOLIC BLOOD PRESSURE: 119 MMHG | DIASTOLIC BLOOD PRESSURE: 62 MMHG

## 2022-08-17 VITALS — SYSTOLIC BLOOD PRESSURE: 104 MMHG | DIASTOLIC BLOOD PRESSURE: 53 MMHG

## 2022-08-17 VITALS — DIASTOLIC BLOOD PRESSURE: 54 MMHG | SYSTOLIC BLOOD PRESSURE: 92 MMHG

## 2022-08-17 VITALS — DIASTOLIC BLOOD PRESSURE: 62 MMHG | SYSTOLIC BLOOD PRESSURE: 134 MMHG

## 2022-08-17 VITALS — DIASTOLIC BLOOD PRESSURE: 57 MMHG | SYSTOLIC BLOOD PRESSURE: 109 MMHG

## 2022-08-17 VITALS — DIASTOLIC BLOOD PRESSURE: 55 MMHG | SYSTOLIC BLOOD PRESSURE: 99 MMHG

## 2022-08-17 VITALS — DIASTOLIC BLOOD PRESSURE: 54 MMHG | SYSTOLIC BLOOD PRESSURE: 100 MMHG

## 2022-08-17 VITALS — SYSTOLIC BLOOD PRESSURE: 108 MMHG | DIASTOLIC BLOOD PRESSURE: 64 MMHG

## 2022-08-17 VITALS — SYSTOLIC BLOOD PRESSURE: 100 MMHG | DIASTOLIC BLOOD PRESSURE: 53 MMHG

## 2022-08-17 VITALS — DIASTOLIC BLOOD PRESSURE: 61 MMHG | SYSTOLIC BLOOD PRESSURE: 112 MMHG

## 2022-08-17 VITALS — DIASTOLIC BLOOD PRESSURE: 59 MMHG | SYSTOLIC BLOOD PRESSURE: 98 MMHG

## 2022-08-17 VITALS — DIASTOLIC BLOOD PRESSURE: 62 MMHG | SYSTOLIC BLOOD PRESSURE: 98 MMHG

## 2022-08-17 VITALS — SYSTOLIC BLOOD PRESSURE: 104 MMHG | DIASTOLIC BLOOD PRESSURE: 60 MMHG

## 2022-08-17 VITALS — DIASTOLIC BLOOD PRESSURE: 79 MMHG | SYSTOLIC BLOOD PRESSURE: 145 MMHG

## 2022-08-17 LAB
ALBUMIN SERPL BCP-MCNC: 1.2 G/DL (ref 3.4–5)
ALP SERPL-CCNC: 104 U/L (ref 46–116)
ALT SERPL W P-5'-P-CCNC: 23 U/L (ref 12–78)
AST SERPL W P-5'-P-CCNC: 29 U/L (ref 15–37)
BASE EXCESS BLDA CALC-SCNC: -7.3 MMOL/L
BASOPHILS # BLD AUTO: 0 K/UL (ref 0–0.2)
BASOPHILS NFR BLD AUTO: 0.2 % (ref 0–2)
BASOPHILS NFR BLD MANUAL: 0 % (ref 0–2)
BILIRUB SERPL-MCNC: 0.4 MG/DL (ref 0.2–1)
BUN SERPL-MCNC: 40 MG/DL (ref 7–18)
CALCIUM SERPL-MCNC: 7.4 MG/DL (ref 8.5–10.1)
CHLORIDE SERPL-SCNC: 119 MMOL/L (ref 98–107)
CK SERPL-CCNC: 71 U/L (ref 26–192)
CO2 SERPL-SCNC: 19 MMOL/L (ref 21–32)
CREAT SERPL-MCNC: 1 MG/DL (ref 0.6–1.3)
D DIMER PPP FEU-MCNC: 5.4 MG/L(FEU (ref 0.17–0.5)
DO-HGB MFR BLDA: 185.9 MMHG
EOSINOPHIL NFR BLD AUTO: 0.6 % (ref 0–6)
EOSINOPHIL NFR BLD MANUAL: 0 % (ref 0–4)
GLUCOSE SERPL-MCNC: 133 MG/DL (ref 74–106)
HCT VFR BLD AUTO: 21 % (ref 33–45)
HCT VFR BLD AUTO: 23 % (ref 33–45)
HCT VFR BLD AUTO: 24 % (ref 33–45)
HGB BLD-MCNC: 7.1 G/DL (ref 11.5–14.8)
HGB BLD-MCNC: 7.6 G/DL (ref 11.5–14.8)
HGB BLD-MCNC: 8 G/DL (ref 11.5–14.8)
INHALED O2 CONCENTRATION: 40 %
LYMPHOCYTES NFR BLD AUTO: 0.4 K/UL (ref 0.8–4.8)
LYMPHOCYTES NFR BLD AUTO: 1.8 % (ref 20–44)
LYMPHOCYTES NFR BLD MANUAL: 1 % (ref 16–48)
MCHC RBC AUTO-ENTMCNC: 33 G/DL (ref 31–36)
MCV RBC AUTO: 91 FL (ref 82–100)
MONOCYTES NFR BLD AUTO: 1.6 K/UL (ref 0.1–1.3)
MONOCYTES NFR BLD AUTO: 7 % (ref 2–12)
MONOCYTES NFR BLD MANUAL: 6 % (ref 0–11)
NEUTROPHILS # BLD AUTO: 20.2 K/UL (ref 1.8–8.9)
NEUTROPHILS NFR BLD AUTO: 90.4 % (ref 43–81)
NEUTS BAND NFR BLD MANUAL: 9 % (ref 0–5)
NEUTS SEG NFR BLD MANUAL: 84 % (ref 42–76)
PCO2 TEMP ADJ BLDA: 30 MMHG (ref 35–45)
PH TEMP ADJ BLDA: 7.37 [PH] (ref 7.35–7.45)
PLATELET # BLD AUTO: 48 K/UL (ref 150–450)
PO2 TEMP ADJ BLDA: 64.8 MMHG (ref 75–100)
POTASSIUM SERPL-SCNC: 2.3 MMOL/L (ref 3.5–5.1)
PROT SERPL-MCNC: 4.3 G/DL (ref 6.4–8.2)
RBC # BLD AUTO: 2.36 MIL/UL (ref 4–5.2)
SAO2 % BLDA: 93.6 % (ref 92–98.5)
SODIUM SERPL-SCNC: 148 MMOL/L (ref 136–145)
VENTILATION MODE VENT: (no result)
WBC NRBC COR # BLD AUTO: 22.4 K/UL (ref 4.3–11)

## 2022-08-17 RX ADMIN — LEVETIRACETAM SCH MG: 100 SOLUTION ORAL at 20:05

## 2022-08-17 RX ADMIN — PROPOFOL PRN MLS/HR: 10 INJECTION, EMULSION INTRAVENOUS at 17:35

## 2022-08-17 RX ADMIN — MEROPENEM SCH MLS/HR: 500 INJECTION INTRAVENOUS at 22:18

## 2022-08-17 RX ADMIN — Medication SCH MG: at 17:23

## 2022-08-17 RX ADMIN — DEXTROSE MONOHYDRATE SCH MLS/HR: 50 INJECTION, SOLUTION INTRAVENOUS at 13:34

## 2022-08-17 RX ADMIN — Medication SCH MG: at 08:26

## 2022-08-17 RX ADMIN — FLUDROCORTISONE ACETATE SCH MG: 0.1 TABLET ORAL at 12:15

## 2022-08-17 RX ADMIN — DONEPEZIL HYDROCHLORIDE SCH MG: 5 TABLET ORAL at 21:38

## 2022-08-17 RX ADMIN — BACLOFEN SCH MG: 10 TABLET ORAL at 08:26

## 2022-08-17 RX ADMIN — POTASSIUM CHLORIDE SCH MEQ: 1.5 POWDER, FOR SOLUTION ORAL at 10:08

## 2022-08-17 RX ADMIN — Medication SCH DROP: at 08:26

## 2022-08-17 RX ADMIN — DEXTROSE AND SODIUM CHLORIDE PRN MLS/HR: 5; 900 INJECTION, SOLUTION INTRAVENOUS at 07:11

## 2022-08-17 RX ADMIN — MEROPENEM SCH MLS/HR: 500 INJECTION INTRAVENOUS at 10:17

## 2022-08-17 RX ADMIN — DEXTROSE AND SODIUM CHLORIDE PRN MLS/HR: 5; 900 INJECTION, SOLUTION INTRAVENOUS at 19:59

## 2022-08-17 RX ADMIN — BACLOFEN SCH MG: 10 TABLET ORAL at 17:23

## 2022-08-17 RX ADMIN — LACOSAMIDE SCH MG: 50 TABLET, FILM COATED ORAL at 08:26

## 2022-08-17 RX ADMIN — FLUDROCORTISONE ACETATE SCH MG: 0.1 TABLET ORAL at 05:26

## 2022-08-17 RX ADMIN — FLUDROCORTISONE ACETATE SCH MG: 0.1 TABLET ORAL at 17:25

## 2022-08-17 RX ADMIN — DEXTROSE AND SODIUM CHLORIDE PRN MLS/HR: 5; 900 INJECTION, SOLUTION INTRAVENOUS at 01:03

## 2022-08-17 RX ADMIN — ATORVASTATIN CALCIUM SCH MG: 10 TABLET, FILM COATED ORAL at 21:38

## 2022-08-17 RX ADMIN — Medication SCH MLS/HR: at 10:52

## 2022-08-17 RX ADMIN — Medication SCH MLS/HR: at 21:39

## 2022-08-17 RX ADMIN — Medication SCH DROP: at 17:23

## 2022-08-17 RX ADMIN — GABAPENTIN SCH MG: 100 CAPSULE ORAL at 08:25

## 2022-08-17 RX ADMIN — POTASSIUM CHLORIDE SCH MEQ: 1.5 POWDER, FOR SOLUTION ORAL at 17:23

## 2022-08-17 RX ADMIN — DEXTROSE AND SODIUM CHLORIDE PRN MLS/HR: 5; 900 INJECTION, SOLUTION INTRAVENOUS at 13:31

## 2022-08-17 RX ADMIN — OXYCODONE HYDROCHLORIDE AND ACETAMINOPHEN SCH MG: 500 TABLET ORAL at 08:26

## 2022-08-17 RX ADMIN — GABAPENTIN SCH MG: 100 CAPSULE ORAL at 17:23

## 2022-08-17 RX ADMIN — BACLOFEN SCH MG: 10 TABLET ORAL at 20:05

## 2022-08-17 RX ADMIN — GABAPENTIN SCH MG: 100 CAPSULE ORAL at 12:15

## 2022-08-17 RX ADMIN — LACOSAMIDE SCH MG: 50 TABLET, FILM COATED ORAL at 20:05

## 2022-08-17 RX ADMIN — BACLOFEN SCH MG: 10 TABLET ORAL at 12:15

## 2022-08-17 RX ADMIN — LEVETIRACETAM SCH MG: 100 SOLUTION ORAL at 08:25

## 2022-08-17 RX ADMIN — PROPOFOL PRN MLS/HR: 10 INJECTION, EMULSION INTRAVENOUS at 05:33

## 2022-08-17 NOTE — NUR
ICU/RN 



PT REMAIN IN BED, SEDATED. PT INTUBATED 50% FIO2 PEEP OF 5 SAT 92% ON BEDSIDE MONITOR, ETT 
7.0, 24CM AT THE LIP. PRESCOTT CATH IN PLACE.  LEFT WRIST SOFT WRIST RESTRAIN IN PLACE, SKIN 
WARM AND INTACT. LEFT NARES IN PLACE 65CM AT THE NOSE, NPO FOR NOW, OK TO USE FOR MEDICATION 
ADMINISTRATION PER DR. GOLDSTEIN. RIGHT UA MIDLINE, LEFT UA PICC LINE AND LEFT HAND 22G IN 
PLACE. D5NS AT 150CC/HR AND PROPOFOL AT 10MCG RUNNING, BP STABLE. BED LOCKED AND IN LOWEST 
POSITION, CALL LIGHT WITHIN REACH, 3 SIDE RAILS UP. ALL NEEDS ATTENDED DURING SHIFT. WILL 
ENDORSE TO NIGHT SHIFT NURSE FOR MIGUEL.

## 2022-08-17 NOTE — NUR
RN Note



Received patient in bed, sedated. Breathing even and unlabored. Patient intubated. ETT 7.0, 
24 CM at the lip. Vent settings FIO2 50%, , AC 18, PEEP 0. O2 saturation of 92 percent 
via bedside monitor. Patient on tele monitoring, sinus tachycardia at 106 BPM. No seizure 
activity noted at this time. Skin is warm and dry to touch. Right nare NGT. clamped. right 
upper arm midline, left upper arm picc, left hand 20g piv. currently infusing propofol at 10 
mcg, d5ns at 150 cc/hr. indwelling avery catheter draing jad colored urine. left wrist 
soft wrist restraints. released. Max assist with passive range of motion. assisted patient 
with turning and repositioning. Bed low, in locked position, will continue to monitor.

## 2022-08-17 NOTE — NUR
RN Note



noted patient with seizure like movement. Patient noted with generalized jerking movement 
from head to toe. Notified charge nurse. Ativan PRN administered. 

-------------------------------------------------------------------------------

Addendum: 08/17/22 at 0424 by AURORA STOCKTON RN

-------------------------------------------------------------------------------

WILL CONTINUE TO MONITOR AND REASSES FOR PRN ATIVAN EFFECTIVENESS

## 2022-08-17 NOTE — NUR
ICU/RN 



PT RECEIVED IN BED, SEDATED. PT INTUBATED 50% FIO2 PEEP OF 5 SAT 97% ON BEDSIDE MONITOR, ETT 
7.0, 24CM AT THE LIP. PRESCOTT CATH IN PLACE.  LEFT WRIST SOFT WRIST RESTRAIN IN PLACE, SKIN 
WARM AND INTACT. LEFT NARES IN PLACE 65CM AT THE NOSE, NPO FOR NOW. RIGHT UA MIDLINE, LEFT 
UA PICC LINE AND LEFT HAND 22G IN PLACE. D5NS AT 150CC/HR AND PROPOFOL AT 5MCG RUNNING, BP 
STABLE. BED LOCKED AND IN LOWEST POSITION, CALL LIGHT WITHIN REACH, 3 SIDE RAILS UP.

## 2022-08-17 NOTE — NUR
ICU/RN



CRITICAL LAB VALUE REPORTED TO DR. GOLDSTEIN. NEW ORDER OF KCL 40 GTUBE BID X3DAYS 
RECEIVED.

## 2022-08-18 VITALS — DIASTOLIC BLOOD PRESSURE: 58 MMHG | SYSTOLIC BLOOD PRESSURE: 99 MMHG

## 2022-08-18 VITALS — DIASTOLIC BLOOD PRESSURE: 50 MMHG | SYSTOLIC BLOOD PRESSURE: 111 MMHG

## 2022-08-18 VITALS — DIASTOLIC BLOOD PRESSURE: 80 MMHG | SYSTOLIC BLOOD PRESSURE: 122 MMHG

## 2022-08-18 VITALS — DIASTOLIC BLOOD PRESSURE: 68 MMHG | SYSTOLIC BLOOD PRESSURE: 109 MMHG

## 2022-08-18 VITALS — DIASTOLIC BLOOD PRESSURE: 59 MMHG | SYSTOLIC BLOOD PRESSURE: 99 MMHG

## 2022-08-18 VITALS — SYSTOLIC BLOOD PRESSURE: 105 MMHG | DIASTOLIC BLOOD PRESSURE: 64 MMHG

## 2022-08-18 VITALS — SYSTOLIC BLOOD PRESSURE: 92 MMHG | DIASTOLIC BLOOD PRESSURE: 52 MMHG

## 2022-08-18 VITALS — SYSTOLIC BLOOD PRESSURE: 120 MMHG | DIASTOLIC BLOOD PRESSURE: 70 MMHG

## 2022-08-18 VITALS — SYSTOLIC BLOOD PRESSURE: 96 MMHG | DIASTOLIC BLOOD PRESSURE: 63 MMHG

## 2022-08-18 VITALS — SYSTOLIC BLOOD PRESSURE: 114 MMHG | DIASTOLIC BLOOD PRESSURE: 63 MMHG

## 2022-08-18 VITALS — SYSTOLIC BLOOD PRESSURE: 95 MMHG | DIASTOLIC BLOOD PRESSURE: 46 MMHG

## 2022-08-18 VITALS — SYSTOLIC BLOOD PRESSURE: 120 MMHG | DIASTOLIC BLOOD PRESSURE: 64 MMHG

## 2022-08-18 VITALS — DIASTOLIC BLOOD PRESSURE: 61 MMHG | SYSTOLIC BLOOD PRESSURE: 102 MMHG

## 2022-08-18 VITALS — DIASTOLIC BLOOD PRESSURE: 72 MMHG | SYSTOLIC BLOOD PRESSURE: 117 MMHG

## 2022-08-18 VITALS — DIASTOLIC BLOOD PRESSURE: 24 MMHG | SYSTOLIC BLOOD PRESSURE: 83 MMHG

## 2022-08-18 VITALS — SYSTOLIC BLOOD PRESSURE: 94 MMHG | DIASTOLIC BLOOD PRESSURE: 60 MMHG

## 2022-08-18 VITALS — SYSTOLIC BLOOD PRESSURE: 99 MMHG | DIASTOLIC BLOOD PRESSURE: 40 MMHG

## 2022-08-18 VITALS — SYSTOLIC BLOOD PRESSURE: 73 MMHG | DIASTOLIC BLOOD PRESSURE: 45 MMHG

## 2022-08-18 VITALS — DIASTOLIC BLOOD PRESSURE: 68 MMHG | SYSTOLIC BLOOD PRESSURE: 108 MMHG

## 2022-08-18 VITALS — SYSTOLIC BLOOD PRESSURE: 92 MMHG | DIASTOLIC BLOOD PRESSURE: 58 MMHG

## 2022-08-18 VITALS — SYSTOLIC BLOOD PRESSURE: 121 MMHG | DIASTOLIC BLOOD PRESSURE: 73 MMHG

## 2022-08-18 VITALS — SYSTOLIC BLOOD PRESSURE: 90 MMHG | DIASTOLIC BLOOD PRESSURE: 33 MMHG

## 2022-08-18 VITALS — DIASTOLIC BLOOD PRESSURE: 81 MMHG | SYSTOLIC BLOOD PRESSURE: 139 MMHG

## 2022-08-18 VITALS — SYSTOLIC BLOOD PRESSURE: 108 MMHG | DIASTOLIC BLOOD PRESSURE: 68 MMHG

## 2022-08-18 VITALS — DIASTOLIC BLOOD PRESSURE: 65 MMHG | SYSTOLIC BLOOD PRESSURE: 97 MMHG

## 2022-08-18 VITALS — DIASTOLIC BLOOD PRESSURE: 69 MMHG | SYSTOLIC BLOOD PRESSURE: 129 MMHG

## 2022-08-18 VITALS — SYSTOLIC BLOOD PRESSURE: 94 MMHG | DIASTOLIC BLOOD PRESSURE: 61 MMHG

## 2022-08-18 VITALS — SYSTOLIC BLOOD PRESSURE: 119 MMHG | DIASTOLIC BLOOD PRESSURE: 69 MMHG

## 2022-08-18 VITALS — SYSTOLIC BLOOD PRESSURE: 119 MMHG | DIASTOLIC BLOOD PRESSURE: 53 MMHG

## 2022-08-18 VITALS — SYSTOLIC BLOOD PRESSURE: 126 MMHG | DIASTOLIC BLOOD PRESSURE: 68 MMHG

## 2022-08-18 VITALS — SYSTOLIC BLOOD PRESSURE: 131 MMHG | DIASTOLIC BLOOD PRESSURE: 68 MMHG

## 2022-08-18 VITALS — DIASTOLIC BLOOD PRESSURE: 66 MMHG | SYSTOLIC BLOOD PRESSURE: 111 MMHG

## 2022-08-18 VITALS — SYSTOLIC BLOOD PRESSURE: 124 MMHG | DIASTOLIC BLOOD PRESSURE: 76 MMHG

## 2022-08-18 VITALS — SYSTOLIC BLOOD PRESSURE: 104 MMHG | DIASTOLIC BLOOD PRESSURE: 61 MMHG

## 2022-08-18 VITALS — SYSTOLIC BLOOD PRESSURE: 96 MMHG | DIASTOLIC BLOOD PRESSURE: 43 MMHG

## 2022-08-18 VITALS — DIASTOLIC BLOOD PRESSURE: 57 MMHG | SYSTOLIC BLOOD PRESSURE: 93 MMHG

## 2022-08-18 VITALS — SYSTOLIC BLOOD PRESSURE: 154 MMHG | DIASTOLIC BLOOD PRESSURE: 82 MMHG

## 2022-08-18 VITALS — SYSTOLIC BLOOD PRESSURE: 122 MMHG | DIASTOLIC BLOOD PRESSURE: 80 MMHG

## 2022-08-18 VITALS — SYSTOLIC BLOOD PRESSURE: 125 MMHG | DIASTOLIC BLOOD PRESSURE: 79 MMHG

## 2022-08-18 VITALS — DIASTOLIC BLOOD PRESSURE: 69 MMHG | SYSTOLIC BLOOD PRESSURE: 98 MMHG

## 2022-08-18 VITALS — DIASTOLIC BLOOD PRESSURE: 71 MMHG | SYSTOLIC BLOOD PRESSURE: 120 MMHG

## 2022-08-18 VITALS — DIASTOLIC BLOOD PRESSURE: 81 MMHG | SYSTOLIC BLOOD PRESSURE: 128 MMHG

## 2022-08-18 VITALS — DIASTOLIC BLOOD PRESSURE: 50 MMHG | SYSTOLIC BLOOD PRESSURE: 103 MMHG

## 2022-08-18 VITALS — DIASTOLIC BLOOD PRESSURE: 42 MMHG | SYSTOLIC BLOOD PRESSURE: 104 MMHG

## 2022-08-18 VITALS — DIASTOLIC BLOOD PRESSURE: 54 MMHG | SYSTOLIC BLOOD PRESSURE: 95 MMHG

## 2022-08-18 VITALS — DIASTOLIC BLOOD PRESSURE: 60 MMHG | SYSTOLIC BLOOD PRESSURE: 109 MMHG

## 2022-08-18 VITALS — DIASTOLIC BLOOD PRESSURE: 62 MMHG | SYSTOLIC BLOOD PRESSURE: 93 MMHG

## 2022-08-18 VITALS — SYSTOLIC BLOOD PRESSURE: 106 MMHG | DIASTOLIC BLOOD PRESSURE: 62 MMHG

## 2022-08-18 VITALS — DIASTOLIC BLOOD PRESSURE: 63 MMHG | SYSTOLIC BLOOD PRESSURE: 102 MMHG

## 2022-08-18 VITALS — DIASTOLIC BLOOD PRESSURE: 75 MMHG | SYSTOLIC BLOOD PRESSURE: 130 MMHG

## 2022-08-18 VITALS — DIASTOLIC BLOOD PRESSURE: 58 MMHG | SYSTOLIC BLOOD PRESSURE: 103 MMHG

## 2022-08-18 VITALS — SYSTOLIC BLOOD PRESSURE: 70 MMHG | DIASTOLIC BLOOD PRESSURE: 46 MMHG

## 2022-08-18 VITALS — SYSTOLIC BLOOD PRESSURE: 103 MMHG | DIASTOLIC BLOOD PRESSURE: 64 MMHG

## 2022-08-18 VITALS — DIASTOLIC BLOOD PRESSURE: 69 MMHG | SYSTOLIC BLOOD PRESSURE: 119 MMHG

## 2022-08-18 VITALS — DIASTOLIC BLOOD PRESSURE: 64 MMHG | SYSTOLIC BLOOD PRESSURE: 103 MMHG

## 2022-08-18 VITALS — DIASTOLIC BLOOD PRESSURE: 73 MMHG | SYSTOLIC BLOOD PRESSURE: 121 MMHG

## 2022-08-18 VITALS — SYSTOLIC BLOOD PRESSURE: 126 MMHG | DIASTOLIC BLOOD PRESSURE: 76 MMHG

## 2022-08-18 VITALS — SYSTOLIC BLOOD PRESSURE: 128 MMHG | DIASTOLIC BLOOD PRESSURE: 80 MMHG

## 2022-08-18 VITALS — DIASTOLIC BLOOD PRESSURE: 76 MMHG | SYSTOLIC BLOOD PRESSURE: 126 MMHG

## 2022-08-18 VITALS — SYSTOLIC BLOOD PRESSURE: 136 MMHG | DIASTOLIC BLOOD PRESSURE: 68 MMHG

## 2022-08-18 VITALS — DIASTOLIC BLOOD PRESSURE: 60 MMHG | SYSTOLIC BLOOD PRESSURE: 101 MMHG

## 2022-08-18 VITALS — SYSTOLIC BLOOD PRESSURE: 89 MMHG | DIASTOLIC BLOOD PRESSURE: 29 MMHG

## 2022-08-18 VITALS — DIASTOLIC BLOOD PRESSURE: 40 MMHG | SYSTOLIC BLOOD PRESSURE: 99 MMHG

## 2022-08-18 VITALS — DIASTOLIC BLOOD PRESSURE: 71 MMHG | SYSTOLIC BLOOD PRESSURE: 124 MMHG

## 2022-08-18 LAB
ALBUMIN SERPL BCP-MCNC: 2.1 G/DL (ref 3.4–5)
ALP SERPL-CCNC: 84 U/L (ref 46–116)
ALT SERPL W P-5'-P-CCNC: 23 U/L (ref 12–78)
AST SERPL W P-5'-P-CCNC: 29 U/L (ref 15–37)
BASE EXCESS BLDA CALC-SCNC: -6.3 MMOL/L
BASOPHILS # BLD AUTO: 0 K/UL (ref 0–0.2)
BASOPHILS NFR BLD AUTO: 0.1 % (ref 0–2)
BILIRUB SERPL-MCNC: 0.7 MG/DL (ref 0.2–1)
BUN SERPL-MCNC: 26 MG/DL (ref 7–18)
CALCIUM SERPL-MCNC: 6.8 MG/DL (ref 8.5–10.1)
CHLORIDE SERPL-SCNC: 118 MMOL/L (ref 98–107)
CO2 SERPL-SCNC: 21 MMOL/L (ref 21–32)
CREAT SERPL-MCNC: 0.6 MG/DL (ref 0.6–1.3)
DO-HGB MFR BLDA: 420.9 MMHG
EOSINOPHIL NFR BLD AUTO: 0.2 % (ref 0–6)
GLUCOSE SERPL-MCNC: 133 MG/DL (ref 74–106)
HCT VFR BLD AUTO: 22 % (ref 33–45)
HCT VFR BLD AUTO: 22 % (ref 33–45)
HCT VFR BLD AUTO: 28 % (ref 33–45)
HGB BLD-MCNC: 7.1 G/DL (ref 11.5–14.8)
HGB BLD-MCNC: 7.3 G/DL (ref 11.5–14.8)
HGB BLD-MCNC: 9.5 G/DL (ref 11.5–14.8)
INHALED O2 CONCENTRATION: 70 %
INTRINSIC PEEP RESPIRATORY: 5 CM H2O
LYMPHOCYTES NFR BLD AUTO: 0.8 K/UL (ref 0.8–4.8)
LYMPHOCYTES NFR BLD AUTO: 4.3 % (ref 20–44)
LYMPHOCYTES NFR BLD MANUAL: 11 % (ref 16–48)
MCHC RBC AUTO-ENTMCNC: 32 G/DL (ref 31–36)
MCV RBC AUTO: 92 FL (ref 82–100)
MONOCYTES NFR BLD AUTO: 1.2 K/UL (ref 0.1–1.3)
MONOCYTES NFR BLD AUTO: 6.4 % (ref 2–12)
MONOCYTES NFR BLD MANUAL: 5 % (ref 0–11)
NEUTROPHILS # BLD AUTO: 17.1 K/UL (ref 1.8–8.9)
NEUTROPHILS NFR BLD AUTO: 89 % (ref 43–81)
NEUTS BAND NFR BLD MANUAL: 7 % (ref 0–5)
NEUTS SEG NFR BLD MANUAL: 77 % (ref 42–76)
PCO2 TEMP ADJ BLDA: 32.5 MMHG (ref 35–45)
PEEP SETTING VENT: 500 ML
PH TEMP ADJ BLDA: 7.37 [PH] (ref 7.35–7.45)
PLATELET # BLD AUTO: 12 K/UL (ref 150–450)
PO2 TEMP ADJ BLDA: 43.3 MMHG (ref 75–100)
POTASSIUM SERPL-SCNC: 2.6 MMOL/L (ref 3.5–5.1)
PROT SERPL-MCNC: 4.8 G/DL (ref 6.4–8.2)
RBC # BLD AUTO: 2.38 MIL/UL (ref 4–5.2)
SAO2 % BLDA: 82.3 % (ref 92–98.5)
SET RATE, BG: 18
SODIUM SERPL-SCNC: 148 MMOL/L (ref 136–145)
WBC NRBC COR # BLD AUTO: 19.2 K/UL (ref 4.3–11)

## 2022-08-18 PROCEDURE — 30233N1 TRANSFUSION OF NONAUTOLOGOUS RED BLOOD CELLS INTO PERIPHERAL VEIN, PERCUTANEOUS APPROACH: ICD-10-PCS | Performed by: INTERNAL MEDICINE

## 2022-08-18 RX ADMIN — FLUDROCORTISONE ACETATE SCH MG: 0.1 TABLET ORAL at 17:24

## 2022-08-18 RX ADMIN — LEVETIRACETAM SCH MG: 100 SOLUTION ORAL at 08:39

## 2022-08-18 RX ADMIN — PROPOFOL PRN MLS/HR: 10 INJECTION, EMULSION INTRAVENOUS at 00:44

## 2022-08-18 RX ADMIN — OXYCODONE HYDROCHLORIDE AND ACETAMINOPHEN SCH MG: 500 TABLET ORAL at 08:39

## 2022-08-18 RX ADMIN — BACLOFEN SCH MG: 10 TABLET ORAL at 17:23

## 2022-08-18 RX ADMIN — BACLOFEN SCH MG: 10 TABLET ORAL at 12:34

## 2022-08-18 RX ADMIN — FLUDROCORTISONE ACETATE SCH MG: 0.1 TABLET ORAL at 12:34

## 2022-08-18 RX ADMIN — DEXTROSE MONOHYDRATE SCH MLS/HR: 50 INJECTION, SOLUTION INTRAVENOUS at 14:06

## 2022-08-18 RX ADMIN — GABAPENTIN SCH MG: 100 CAPSULE ORAL at 17:24

## 2022-08-18 RX ADMIN — POTASSIUM CHLORIDE SCH MEQ: 1.5 POWDER, FOR SOLUTION ORAL at 08:39

## 2022-08-18 RX ADMIN — Medication PRN ML: at 14:34

## 2022-08-18 RX ADMIN — DONEPEZIL HYDROCHLORIDE SCH MG: 5 TABLET ORAL at 21:29

## 2022-08-18 RX ADMIN — SODIUM CHLORIDE PRN MLS/HR: 9 INJECTION, SOLUTION INTRAVENOUS at 11:17

## 2022-08-18 RX ADMIN — Medication SCH DROP: at 08:40

## 2022-08-18 RX ADMIN — Medication SCH MG: at 08:39

## 2022-08-18 RX ADMIN — MEROPENEM SCH MLS/HR: 500 INJECTION INTRAVENOUS at 21:25

## 2022-08-18 RX ADMIN — FLUDROCORTISONE ACETATE SCH MG: 0.1 TABLET ORAL at 07:44

## 2022-08-18 RX ADMIN — DEXTROSE MONOHYDRATE SCH MLS/HR: 50 INJECTION, SOLUTION INTRAVENOUS at 00:54

## 2022-08-18 RX ADMIN — MEROPENEM SCH MLS/HR: 500 INJECTION INTRAVENOUS at 10:35

## 2022-08-18 RX ADMIN — FLUDROCORTISONE ACETATE SCH MG: 0.1 TABLET ORAL at 00:43

## 2022-08-18 RX ADMIN — BACLOFEN SCH MG: 10 TABLET ORAL at 21:29

## 2022-08-18 RX ADMIN — PROPOFOL PRN MLS/HR: 10 INJECTION, EMULSION INTRAVENOUS at 18:06

## 2022-08-18 RX ADMIN — POTASSIUM CHLORIDE SCH MEQ: 1.5 POWDER, FOR SOLUTION ORAL at 17:23

## 2022-08-18 RX ADMIN — DEXTROSE AND SODIUM CHLORIDE PRN MLS/HR: 5; 900 INJECTION, SOLUTION INTRAVENOUS at 09:40

## 2022-08-18 RX ADMIN — BACLOFEN SCH MG: 10 TABLET ORAL at 08:39

## 2022-08-18 RX ADMIN — ATORVASTATIN CALCIUM SCH MG: 10 TABLET, FILM COATED ORAL at 21:29

## 2022-08-18 RX ADMIN — FAMOTIDINE SCH MG: 10 INJECTION INTRAVENOUS at 08:39

## 2022-08-18 RX ADMIN — GABAPENTIN SCH MG: 100 CAPSULE ORAL at 08:39

## 2022-08-18 RX ADMIN — Medication SCH MG: at 17:24

## 2022-08-18 RX ADMIN — LACOSAMIDE SCH MG: 50 TABLET, FILM COATED ORAL at 08:39

## 2022-08-18 RX ADMIN — LEVETIRACETAM SCH MG: 100 SOLUTION ORAL at 21:29

## 2022-08-18 RX ADMIN — PROPOFOL PRN MLS/HR: 10 INJECTION, EMULSION INTRAVENOUS at 12:34

## 2022-08-18 RX ADMIN — Medication SCH DROP: at 17:24

## 2022-08-18 RX ADMIN — DEXTROSE AND SODIUM CHLORIDE PRN MLS/HR: 5; 900 INJECTION, SOLUTION INTRAVENOUS at 00:45

## 2022-08-18 RX ADMIN — GABAPENTIN SCH MG: 100 CAPSULE ORAL at 12:34

## 2022-08-18 RX ADMIN — LACOSAMIDE SCH MG: 50 TABLET, FILM COATED ORAL at 21:29

## 2022-08-18 RX ADMIN — MEROPENEM SCH MLS/HR: 500 INJECTION INTRAVENOUS at 13:04

## 2022-08-18 NOTE — NUR
ICU/R



TALKED TO  BEATA ANTOINE REGARDING PT'S CONDITION. BEATA VERBALIZES 
UNDERSTANDING AND DESIRE TO SPEAK TO PRIMARY DOCTOR REGARDING HER CARE. MESSAGE LEFT TO 
SISTER CLAYTON.

## 2022-08-18 NOTE — NUR
ICU/RN 



PT REMAINS IN BED, SEDATED. PT INTUBATED 90% FIO2 PEEP OF 12 AC 24 SAT 98% ON BEDSIDE 
MONITOR, ETT 7.0, 24CM AT THE LIP. PRESCOTT CATH IN PLACE.  LEFT WRIST SOFT WRIST RESTRAIN IN 
PLACE, SKIN WARM AND INTACT. LEFT NARES IN PLACE 65CM AT THE NOSE. RIGHT UA MIDLINE, LEFT UA 
PICC LINE AND LEFT HAND 22G IN PLACE. PROPOFOL AT 40MCG, LEVO AT 0.06 MCG AND PLT 
TRANSFUSION RUNNING. ALL NEEDS ATTENDED DURING SHIFT. BED LOCKED AND IN LOWEST POSITION, 
CALL LIGHT WITHIN REACH, 3 SIDE RAILS UP. WILL ENDORSE TO NIGHT SHIFT NURSE FOR MIGUEL.

## 2022-08-18 NOTE — NUR
ICU/RN



TALKED ON THE PHONE WITH SISTER CLAYTON. PT'S CONDITION EXPLAINED, CLAYTON VERBALIZES 
UNDERSTANDING OF HER CONDITION AND DESIRE TO COME SEE PT BEFORE ANY DECISION REGARDING HER 
RESUSCITATION STATUS TO BE MADE.

## 2022-08-18 NOTE — NUR
ICU/RN



CRITICAL LAB VALUE OF PROCAL 7.06 AND PLATELET 12 RECEIVED. REPORTED TO DR. GOLDSTEIN, 
ORDER FOR 2 UNIT PLT STAT RECEIVED.

## 2022-08-18 NOTE — NUR
ICU/RN



SISTER LANEY AT BEDSIDE. LANEY REPORT THE POSSIBILITY OF HER AND OTHER SISTER CLAYTON TO 
BE POWER OF , UNSURE IF PT CHANGED POA LATER ON. [facility] 
CONTACTED [phone] AND TALKED TO [facility] REGARDING FAXING ANY LEGAL DOCUMENTS THE PT 
SHARED WHILE IN THEIR CARE. [facility] REPORTS THEIR RECORD DEPARTMENT TO BE CLOSED AND WILL FAX 
THEM IN TOMORROW MORNING. SISTER REPORT WILL TRY TO GET A HOLD OF THEIR OWN COPY OF THE 
POWER OF  PAPERWORK. 



SISTERS VERBALIZE THAT THE PT WOULD NOT WANT TO BE KEPT ALIVE THROUGH A MECHANICAL 
VENTILATOR AND ARE CONSIDERING PLACING PATIENT DNR OR COMFORT CARE. FAMILY WILL COMMUNICATE 
WITH  AND OTHER SIBLINGS AND DECIDE ON PT'S POC.

## 2022-08-18 NOTE — NUR
ICU/RN 



PT RECEIVED IN BED, SEDATED. PT INTUBATED 100% FIO2 PEEP OF 5 SAT 97% ON BEDSIDE MONITOR, 
ETT 7.0, 24CM AT THE LIP. PRESCOTT CATH IN PLACE.  LEFT WRIST SOFT WRIST RESTRAIN IN PLACE, 
SKIN WARM AND INTACT. LEFT NARES IN PLACE 65CM AT THE NOSE. RIGHT UA MIDLINE, LEFT UA PICC 
LINE AND LEFT HAND 22G IN PLACE. D5NS AT 150CC/HR AND PROPOFOL AT 15MCG RUNNING, BP STABLE. 
BED LOCKED AND IN LOWEST POSITION, CALL LIGHT WITHIN REACH, 3 SIDE RAILS UP.

## 2022-08-19 VITALS — SYSTOLIC BLOOD PRESSURE: 101 MMHG | DIASTOLIC BLOOD PRESSURE: 25 MMHG

## 2022-08-19 VITALS — SYSTOLIC BLOOD PRESSURE: 107 MMHG | DIASTOLIC BLOOD PRESSURE: 66 MMHG

## 2022-08-19 VITALS — SYSTOLIC BLOOD PRESSURE: 104 MMHG | DIASTOLIC BLOOD PRESSURE: 66 MMHG

## 2022-08-19 VITALS — SYSTOLIC BLOOD PRESSURE: 112 MMHG | DIASTOLIC BLOOD PRESSURE: 63 MMHG

## 2022-08-19 VITALS — SYSTOLIC BLOOD PRESSURE: 94 MMHG | DIASTOLIC BLOOD PRESSURE: 57 MMHG

## 2022-08-19 VITALS — SYSTOLIC BLOOD PRESSURE: 114 MMHG | DIASTOLIC BLOOD PRESSURE: 64 MMHG

## 2022-08-19 VITALS — SYSTOLIC BLOOD PRESSURE: 126 MMHG | DIASTOLIC BLOOD PRESSURE: 74 MMHG

## 2022-08-19 VITALS — SYSTOLIC BLOOD PRESSURE: 107 MMHG | DIASTOLIC BLOOD PRESSURE: 64 MMHG

## 2022-08-19 VITALS — SYSTOLIC BLOOD PRESSURE: 142 MMHG | DIASTOLIC BLOOD PRESSURE: 81 MMHG

## 2022-08-19 VITALS — SYSTOLIC BLOOD PRESSURE: 94 MMHG | DIASTOLIC BLOOD PRESSURE: 55 MMHG

## 2022-08-19 VITALS — SYSTOLIC BLOOD PRESSURE: 138 MMHG | DIASTOLIC BLOOD PRESSURE: 76 MMHG

## 2022-08-19 VITALS — DIASTOLIC BLOOD PRESSURE: 69 MMHG | SYSTOLIC BLOOD PRESSURE: 121 MMHG

## 2022-08-19 VITALS — SYSTOLIC BLOOD PRESSURE: 138 MMHG | DIASTOLIC BLOOD PRESSURE: 79 MMHG

## 2022-08-19 VITALS — SYSTOLIC BLOOD PRESSURE: 99 MMHG | DIASTOLIC BLOOD PRESSURE: 60 MMHG

## 2022-08-19 VITALS — DIASTOLIC BLOOD PRESSURE: 61 MMHG | SYSTOLIC BLOOD PRESSURE: 102 MMHG

## 2022-08-19 VITALS — SYSTOLIC BLOOD PRESSURE: 101 MMHG | DIASTOLIC BLOOD PRESSURE: 69 MMHG

## 2022-08-19 VITALS — DIASTOLIC BLOOD PRESSURE: 84 MMHG | SYSTOLIC BLOOD PRESSURE: 139 MMHG

## 2022-08-19 VITALS — SYSTOLIC BLOOD PRESSURE: 124 MMHG | DIASTOLIC BLOOD PRESSURE: 73 MMHG

## 2022-08-19 VITALS — SYSTOLIC BLOOD PRESSURE: 116 MMHG | DIASTOLIC BLOOD PRESSURE: 69 MMHG

## 2022-08-19 VITALS — SYSTOLIC BLOOD PRESSURE: 76 MMHG | DIASTOLIC BLOOD PRESSURE: 46 MMHG

## 2022-08-19 VITALS — DIASTOLIC BLOOD PRESSURE: 64 MMHG | SYSTOLIC BLOOD PRESSURE: 105 MMHG

## 2022-08-19 VITALS — SYSTOLIC BLOOD PRESSURE: 96 MMHG | DIASTOLIC BLOOD PRESSURE: 61 MMHG

## 2022-08-19 VITALS — SYSTOLIC BLOOD PRESSURE: 122 MMHG | DIASTOLIC BLOOD PRESSURE: 75 MMHG

## 2022-08-19 VITALS — DIASTOLIC BLOOD PRESSURE: 57 MMHG | SYSTOLIC BLOOD PRESSURE: 103 MMHG

## 2022-08-19 VITALS — SYSTOLIC BLOOD PRESSURE: 136 MMHG | DIASTOLIC BLOOD PRESSURE: 85 MMHG

## 2022-08-19 VITALS — DIASTOLIC BLOOD PRESSURE: 52 MMHG | SYSTOLIC BLOOD PRESSURE: 95 MMHG

## 2022-08-19 VITALS — DIASTOLIC BLOOD PRESSURE: 83 MMHG | SYSTOLIC BLOOD PRESSURE: 139 MMHG

## 2022-08-19 VITALS — SYSTOLIC BLOOD PRESSURE: 125 MMHG | DIASTOLIC BLOOD PRESSURE: 73 MMHG

## 2022-08-19 VITALS — SYSTOLIC BLOOD PRESSURE: 82 MMHG | DIASTOLIC BLOOD PRESSURE: 47 MMHG

## 2022-08-19 VITALS — DIASTOLIC BLOOD PRESSURE: 82 MMHG | SYSTOLIC BLOOD PRESSURE: 136 MMHG

## 2022-08-19 VITALS — SYSTOLIC BLOOD PRESSURE: 106 MMHG | DIASTOLIC BLOOD PRESSURE: 65 MMHG

## 2022-08-19 VITALS — SYSTOLIC BLOOD PRESSURE: 117 MMHG | DIASTOLIC BLOOD PRESSURE: 77 MMHG

## 2022-08-19 VITALS — SYSTOLIC BLOOD PRESSURE: 108 MMHG | DIASTOLIC BLOOD PRESSURE: 62 MMHG

## 2022-08-19 VITALS — SYSTOLIC BLOOD PRESSURE: 109 MMHG | DIASTOLIC BLOOD PRESSURE: 65 MMHG

## 2022-08-19 VITALS — SYSTOLIC BLOOD PRESSURE: 143 MMHG | DIASTOLIC BLOOD PRESSURE: 82 MMHG

## 2022-08-19 VITALS — SYSTOLIC BLOOD PRESSURE: 115 MMHG | DIASTOLIC BLOOD PRESSURE: 76 MMHG

## 2022-08-19 VITALS — DIASTOLIC BLOOD PRESSURE: 57 MMHG | SYSTOLIC BLOOD PRESSURE: 95 MMHG

## 2022-08-19 VITALS — SYSTOLIC BLOOD PRESSURE: 98 MMHG | DIASTOLIC BLOOD PRESSURE: 54 MMHG

## 2022-08-19 VITALS — SYSTOLIC BLOOD PRESSURE: 143 MMHG | DIASTOLIC BLOOD PRESSURE: 79 MMHG

## 2022-08-19 VITALS — SYSTOLIC BLOOD PRESSURE: 99 MMHG | DIASTOLIC BLOOD PRESSURE: 52 MMHG

## 2022-08-19 VITALS — DIASTOLIC BLOOD PRESSURE: 72 MMHG | SYSTOLIC BLOOD PRESSURE: 123 MMHG

## 2022-08-19 VITALS — SYSTOLIC BLOOD PRESSURE: 115 MMHG | DIASTOLIC BLOOD PRESSURE: 71 MMHG

## 2022-08-19 VITALS — DIASTOLIC BLOOD PRESSURE: 67 MMHG | SYSTOLIC BLOOD PRESSURE: 110 MMHG

## 2022-08-19 VITALS — SYSTOLIC BLOOD PRESSURE: 146 MMHG | DIASTOLIC BLOOD PRESSURE: 82 MMHG

## 2022-08-19 VITALS — SYSTOLIC BLOOD PRESSURE: 129 MMHG | DIASTOLIC BLOOD PRESSURE: 77 MMHG

## 2022-08-19 VITALS — DIASTOLIC BLOOD PRESSURE: 77 MMHG | SYSTOLIC BLOOD PRESSURE: 134 MMHG

## 2022-08-19 VITALS — SYSTOLIC BLOOD PRESSURE: 82 MMHG | DIASTOLIC BLOOD PRESSURE: 50 MMHG

## 2022-08-19 VITALS — SYSTOLIC BLOOD PRESSURE: 106 MMHG | DIASTOLIC BLOOD PRESSURE: 59 MMHG

## 2022-08-19 VITALS — DIASTOLIC BLOOD PRESSURE: 59 MMHG | SYSTOLIC BLOOD PRESSURE: 102 MMHG

## 2022-08-19 VITALS — SYSTOLIC BLOOD PRESSURE: 102 MMHG | DIASTOLIC BLOOD PRESSURE: 60 MMHG

## 2022-08-19 VITALS — DIASTOLIC BLOOD PRESSURE: 85 MMHG | SYSTOLIC BLOOD PRESSURE: 146 MMHG

## 2022-08-19 VITALS — DIASTOLIC BLOOD PRESSURE: 83 MMHG | SYSTOLIC BLOOD PRESSURE: 136 MMHG

## 2022-08-19 VITALS — SYSTOLIC BLOOD PRESSURE: 100 MMHG | DIASTOLIC BLOOD PRESSURE: 58 MMHG

## 2022-08-19 VITALS — DIASTOLIC BLOOD PRESSURE: 55 MMHG | SYSTOLIC BLOOD PRESSURE: 115 MMHG

## 2022-08-19 VITALS — DIASTOLIC BLOOD PRESSURE: 59 MMHG | SYSTOLIC BLOOD PRESSURE: 90 MMHG

## 2022-08-19 LAB
ALBUMIN SERPL BCP-MCNC: 1.7 G/DL (ref 3.4–5)
ALP SERPL-CCNC: 108 U/L (ref 46–116)
ALT SERPL W P-5'-P-CCNC: 19 U/L (ref 12–78)
AST SERPL W P-5'-P-CCNC: 36 U/L (ref 15–37)
BASE EXCESS BLDA CALC-SCNC: -1.9 MMOL/L
BASOPHILS # BLD AUTO: 0 K/UL (ref 0–0.2)
BASOPHILS NFR BLD AUTO: 0.1 % (ref 0–2)
BASOPHILS NFR BLD MANUAL: 0 % (ref 0–2)
BILIRUB SERPL-MCNC: 1.4 MG/DL (ref 0.2–1)
BUN SERPL-MCNC: 24 MG/DL (ref 7–18)
CALCIUM SERPL-MCNC: 7.1 MG/DL (ref 8.5–10.1)
CHLORIDE SERPL-SCNC: 116 MMOL/L (ref 98–107)
CO2 SERPL-SCNC: 27 MMOL/L (ref 21–32)
CREAT SERPL-MCNC: 0.6 MG/DL (ref 0.6–1.3)
DO-HGB MFR BLDA: 362.9 MMHG
EOSINOPHIL NFR BLD AUTO: 2.5 % (ref 0–6)
EOSINOPHIL NFR BLD MANUAL: 1 % (ref 0–4)
GLUCOSE SERPL-MCNC: 83 MG/DL (ref 74–106)
HCT VFR BLD AUTO: 25 % (ref 33–45)
HCT VFR BLD AUTO: 25 % (ref 33–45)
HGB BLD-MCNC: 8.6 G/DL (ref 11.5–14.8)
HGB BLD-MCNC: 8.6 G/DL (ref 11.5–14.8)
INHALED O2 CONCENTRATION: 70 %
INTRINSIC PEEP RESPIRATORY: 12 CM H2O
LYMPHOCYTES NFR BLD AUTO: 1.1 K/UL (ref 0.8–4.8)
LYMPHOCYTES NFR BLD AUTO: 5.1 % (ref 20–44)
LYMPHOCYTES NFR BLD MANUAL: 13 % (ref 16–48)
MCHC RBC AUTO-ENTMCNC: 34 G/DL (ref 31–36)
MCV RBC AUTO: 89 FL (ref 82–100)
MONOCYTES NFR BLD AUTO: 1.1 K/UL (ref 0.1–1.3)
MONOCYTES NFR BLD AUTO: 4.9 % (ref 2–12)
MONOCYTES NFR BLD MANUAL: 4 % (ref 0–11)
NEUTROPHILS # BLD AUTO: 19.4 K/UL (ref 1.8–8.9)
NEUTROPHILS NFR BLD AUTO: 87.4 % (ref 43–81)
NEUTS BAND NFR BLD MANUAL: 6 % (ref 0–5)
NEUTS SEG NFR BLD MANUAL: 76 % (ref 42–76)
PCO2 TEMP ADJ BLDA: 33.5 MMHG (ref 35–45)
PEEP SETTING VENT: 450 ML
PH TEMP ADJ BLDA: 7.43 [PH] (ref 7.35–7.45)
PLATELET # BLD AUTO: 24 K/UL (ref 150–450)
PO2 TEMP ADJ BLDA: 100.2 MMHG (ref 75–100)
POTASSIUM SERPL-SCNC: 2.6 MMOL/L (ref 3.5–5.1)
PROT SERPL-MCNC: 4.6 G/DL (ref 6.4–8.2)
RBC # BLD AUTO: 2.82 MIL/UL (ref 4–5.2)
SAO2 % BLDA: 97.3 % (ref 92–98.5)
SET RATE, BG: 24
SODIUM SERPL-SCNC: 149 MMOL/L (ref 136–145)
VENTILATION MODE VENT: (no result)
WBC NRBC COR # BLD AUTO: 22.1 K/UL (ref 4.3–11)

## 2022-08-19 RX ADMIN — Medication SCH MG: at 08:18

## 2022-08-19 RX ADMIN — LEVETIRACETAM SCH MG: 100 SOLUTION ORAL at 08:17

## 2022-08-19 RX ADMIN — BACLOFEN SCH MG: 10 TABLET ORAL at 08:18

## 2022-08-19 RX ADMIN — POTASSIUM CHLORIDE SCH MLS/HR: 200 INJECTION, SOLUTION INTRAVENOUS at 15:00

## 2022-08-19 RX ADMIN — POTASSIUM CHLORIDE SCH MLS/HR: 200 INJECTION, SOLUTION INTRAVENOUS at 08:56

## 2022-08-19 RX ADMIN — POTASSIUM CHLORIDE SCH MLS/HR: 200 INJECTION, SOLUTION INTRAVENOUS at 14:11

## 2022-08-19 RX ADMIN — POTASSIUM CHLORIDE SCH MLS/HR: 200 INJECTION, SOLUTION INTRAVENOUS at 07:49

## 2022-08-19 RX ADMIN — PROPOFOL PRN MLS/HR: 10 INJECTION, EMULSION INTRAVENOUS at 00:15

## 2022-08-19 RX ADMIN — GABAPENTIN SCH MG: 100 CAPSULE ORAL at 08:18

## 2022-08-19 RX ADMIN — POTASSIUM CHLORIDE SCH MEQ: 1.5 POWDER, FOR SOLUTION ORAL at 17:00

## 2022-08-19 RX ADMIN — POTASSIUM CHLORIDE SCH MLS/HR: 200 INJECTION, SOLUTION INTRAVENOUS at 10:03

## 2022-08-19 RX ADMIN — PROPOFOL PRN MLS/HR: 10 INJECTION, EMULSION INTRAVENOUS at 14:11

## 2022-08-19 RX ADMIN — FLUDROCORTISONE ACETATE SCH MG: 0.1 TABLET ORAL at 00:14

## 2022-08-19 RX ADMIN — MEROPENEM SCH MLS/HR: 500 INJECTION INTRAVENOUS at 05:22

## 2022-08-19 RX ADMIN — Medication PRN ML: at 04:45

## 2022-08-19 RX ADMIN — FLUDROCORTISONE ACETATE SCH MG: 0.1 TABLET ORAL at 12:17

## 2022-08-19 RX ADMIN — Medication SCH DROP: at 08:17

## 2022-08-19 RX ADMIN — SODIUM CHLORIDE PRN MLS/HR: 9 INJECTION, SOLUTION INTRAVENOUS at 05:22

## 2022-08-19 RX ADMIN — PROPOFOL PRN MLS/HR: 10 INJECTION, EMULSION INTRAVENOUS at 06:17

## 2022-08-19 RX ADMIN — POTASSIUM CHLORIDE SCH MLS/HR: 200 INJECTION, SOLUTION INTRAVENOUS at 11:12

## 2022-08-19 RX ADMIN — FLUDROCORTISONE ACETATE SCH MG: 0.1 TABLET ORAL at 06:09

## 2022-08-19 RX ADMIN — OXYCODONE HYDROCHLORIDE AND ACETAMINOPHEN SCH MG: 500 TABLET ORAL at 08:17

## 2022-08-19 RX ADMIN — DEXTROSE MONOHYDRATE SCH MLS/HR: 50 INJECTION, SOLUTION INTRAVENOUS at 02:52

## 2022-08-19 RX ADMIN — GABAPENTIN SCH MG: 100 CAPSULE ORAL at 12:16

## 2022-08-19 RX ADMIN — BACLOFEN SCH MG: 10 TABLET ORAL at 12:17

## 2022-08-19 RX ADMIN — POTASSIUM CHLORIDE SCH MLS/HR: 200 INJECTION, SOLUTION INTRAVENOUS at 12:15

## 2022-08-19 RX ADMIN — LACOSAMIDE SCH MG: 50 TABLET, FILM COATED ORAL at 08:18

## 2022-08-19 RX ADMIN — MEROPENEM SCH MLS/HR: 500 INJECTION INTRAVENOUS at 12:16

## 2022-08-19 RX ADMIN — POTASSIUM CHLORIDE SCH MLS/HR: 200 INJECTION, SOLUTION INTRAVENOUS at 15:12

## 2022-08-19 RX ADMIN — POTASSIUM CHLORIDE SCH MEQ: 1.5 POWDER, FOR SOLUTION ORAL at 08:17

## 2022-08-19 NOTE — NUR
RN NOTES

 DONE TALKING WITH THE FAMILY AT BEDSIDE. PT.'S SISTER AND FAMILY DECIDED TO MAKE PT. 
DNR/DNI / COMFORT CARE. DR. GOLDSTEIN AND DR. NETTLES NOTIFIED. GOT NEW ORDERS AND MEDS FROM 
DR. NETTLES TO START COMFORT CARE. WILL PROVIDE SUPPORT TO FAMILY THROUGHOUT THE PROCESS.

## 2022-08-19 NOTE — NUR
ICU RN NOTE:



VANCO TROUGH IS 28. HELD VANCO DOSE FOR 1400. PHARMACY NOTIFIED AND CHANGED DOSE  MG 
STARTING @ 2000 TONIGHT. 17

## 2022-08-19 NOTE — NUR
ICU/LVN

CALLED THE ON CALL FOR POTASSIUM OF 2.4 AND PLT OF 24. NO NEW ORDERS RECIEVED DUE TO THE 
FACT PT'S FAMILY COMING TO MAKE PT COMFORT CARE.

## 2022-08-19 NOTE — NUR
ICU RN NOTE:



LEVO OFF AT THIS TIME. BP STABLE. ORAL SUCTIONING DONE, SMALL AMOUNT OF THICK BLOODY 
SECRETION NOTED. WILL CONTINUE TO MONITOR.

## 2022-08-19 NOTE — NUR
ICU RN NOTE:



PT. NOTED TO HAVE AXILLARY TEMP OF 96.1 F. THREE WARM BLANKETS APPLIED. WILL CONTINUE TO 
MONITOR PT.'S TEMP.

## 2022-08-19 NOTE — NUR
RN NOTES 

PT HAS NO PULSE , NO RESPIRATION, NO BP , PUPILS ARE FIXED AND NON REACTIVE, PT PRONOUNCED 
DEATH BY TWO RN, DR NETTLES AND DR RAUSCH NOTIFIED ,SUPPORTIVE FAMILY AT THE BEDSIDE ,  
FAMILY AT THE BEDSIDE, PT BELONGING GIVEN TO PT SISTER .

## 2022-08-19 NOTE — NUR
RN NOTES 

RECEIVED PT ON BED, INTUBATED AND SEDATED, TOLERAING VENT SETTING WELL, VSS STABLE , PT ON 
LEVO FOR BP SUPPORT AT .2 MCG/KG/MIN, DIPRIVAN AT 40 MCG/KG/MIN RUNNING , ON TELE ST HR IN 
100'S , R NARE NG TUBE INTACT , TF AT 20CC /HR RUNNING , NO RESIDUAL NOTED, PRESCOTT DRAINING 
TO GRAVITY WITH LIGHT PINKISH CLOUDY URINE, IV SITES CDI, SR UP X3, CALL LIGHT WITHIN EASY 
REACH, BED LOCKED AND IN LOWEST POSITION, CONTINUE TO MONITOR.

## 2022-08-19 NOTE — NUR
ICU RN NOTE:



PINKY PERES (SISTER AND DPOA) AND FEW OTHER FAMILY MEMBERS ARE STILL AT BEDSIDE. PER 
PINKY (SISTER), PT.'S  IS NOT INVOLVED IN PT.'S LIFE FOR A LONG TIME. SHE'S ALSO 
REQUESTING FOR DR. GOLDSTEIN TO TALK TO HER. DR. NETTLES ALSO NOTIFIED. WAITING FOR DR. GOLDSTEIN AND/OR DR. NETTLES TO COME AND TALK TO FAMILY.

## 2022-08-19 NOTE — NUR
Received pt terminal extubation order. pt extubated at 1830. Family at bedside. Charge nurse 
aware.

## 2022-10-03 NOTE — NUR
PO MEDICATIONS WITHELD, PT DID NOT PASS SWALLOW EVAL. Problem List Items Addressed This Visit    None
